# Patient Record
Sex: MALE | Race: WHITE | Employment: FULL TIME | URBAN - METROPOLITAN AREA
[De-identification: names, ages, dates, MRNs, and addresses within clinical notes are randomized per-mention and may not be internally consistent; named-entity substitution may affect disease eponyms.]

---

## 2020-08-10 ENCOUNTER — HOSPITAL ENCOUNTER (EMERGENCY)
Age: 70
Discharge: HOME | End: 2020-08-10
Payer: COMMERCIAL

## 2020-08-10 VITALS
BODY MASS INDEX: 30.21 KG/M2 | DIASTOLIC BLOOD PRESSURE: 85 MMHG | HEART RATE: 86 BPM | WEIGHT: 211 LBS | TEMPERATURE: 98.3 F | SYSTOLIC BLOOD PRESSURE: 136 MMHG | RESPIRATION RATE: 19 BRPM | OXYGEN SATURATION: 95 % | HEIGHT: 70 IN

## 2020-08-10 LAB
A/G RATIO: 1.2 (ref 1.1–2.2)
ALBUMIN SERPL-MCNC: 4.1 G/DL (ref 3.4–5)
ALP BLD-CCNC: 80 U/L (ref 40–129)
ALT SERPL-CCNC: 19 U/L (ref 10–40)
ANION GAP SERPL CALCULATED.3IONS-SCNC: 14 MMOL/L (ref 3–16)
AST SERPL-CCNC: 17 U/L (ref 15–37)
BASOPHILS ABSOLUTE: 0.1 K/UL (ref 0–0.2)
BASOPHILS RELATIVE PERCENT: 0.4 %
BILIRUB SERPL-MCNC: 0.9 MG/DL (ref 0–1)
BUN BLDV-MCNC: 19 MG/DL (ref 7–20)
CALCIUM SERPL-MCNC: 9.3 MG/DL (ref 8.3–10.6)
CHLORIDE BLD-SCNC: 103 MMOL/L (ref 99–110)
CO2: 22 MMOL/L (ref 21–32)
CREAT SERPL-MCNC: 0.7 MG/DL (ref 0.8–1.3)
EOSINOPHILS ABSOLUTE: 0.2 K/UL (ref 0–0.6)
EOSINOPHILS RELATIVE PERCENT: 1.5 %
GFR AFRICAN AMERICAN: >60
GFR NON-AFRICAN AMERICAN: >60
GLOBULIN: 3.3 G/DL
GLUCOSE BLD-MCNC: 105 MG/DL (ref 70–99)
HCT VFR BLD CALC: 48 % (ref 40.5–52.5)
HEMOGLOBIN: 16 G/DL (ref 13.5–17.5)
LACTIC ACID: 1.2 MMOL/L (ref 0.4–2)
LYMPHOCYTES ABSOLUTE: 1.3 K/UL (ref 1–5.1)
LYMPHOCYTES RELATIVE PERCENT: 11.1 %
MCH RBC QN AUTO: 29.4 PG (ref 26–34)
MCHC RBC AUTO-ENTMCNC: 33.3 G/DL (ref 31–36)
MCV RBC AUTO: 88.4 FL (ref 80–100)
MONOCYTES ABSOLUTE: 1.1 K/UL (ref 0–1.3)
MONOCYTES RELATIVE PERCENT: 9.1 %
NEUTROPHILS ABSOLUTE: 9.2 K/UL (ref 1.7–7.7)
NEUTROPHILS RELATIVE PERCENT: 77.9 %
PDW BLD-RTO: 15.1 % (ref 12.4–15.4)
PLATELET # BLD: 191 K/UL (ref 135–450)
PMV BLD AUTO: 7.6 FL (ref 5–10.5)
POTASSIUM SERPL-SCNC: 3.6 MMOL/L (ref 3.5–5.1)
RBC # BLD: 5.43 M/UL (ref 4.2–5.9)
SODIUM BLD-SCNC: 139 MMOL/L (ref 136–145)
TOTAL PROTEIN: 7.4 G/DL (ref 6.4–8.2)
WBC # BLD: 11.9 K/UL (ref 4–11)

## 2020-08-10 PROCEDURE — 87040 BLOOD CULTURE FOR BACTERIA: CPT

## 2020-08-10 PROCEDURE — 96367 TX/PROPH/DG ADDL SEQ IV INF: CPT

## 2020-08-10 PROCEDURE — 99282 EMERGENCY DEPT VISIT SF MDM: CPT

## 2020-08-10 PROCEDURE — 6360000002 HC RX W HCPCS: Performed by: PHYSICIAN ASSISTANT

## 2020-08-10 PROCEDURE — 96366 THER/PROPH/DIAG IV INF ADDON: CPT

## 2020-08-10 PROCEDURE — 85025 COMPLETE CBC W/AUTO DIFF WBC: CPT

## 2020-08-10 PROCEDURE — 2580000003 HC RX 258: Performed by: PHYSICIAN ASSISTANT

## 2020-08-10 PROCEDURE — 96365 THER/PROPH/DIAG IV INF INIT: CPT

## 2020-08-10 PROCEDURE — 80053 COMPREHEN METABOLIC PANEL: CPT

## 2020-08-10 PROCEDURE — 83605 ASSAY OF LACTIC ACID: CPT

## 2020-08-10 RX ORDER — 0.9 % SODIUM CHLORIDE 0.9 %
30 INTRAVENOUS SOLUTION INTRAVENOUS ONCE
Status: COMPLETED | OUTPATIENT
Start: 2020-08-10 | End: 2020-08-10

## 2020-08-10 RX ORDER — DOXYCYCLINE HYCLATE 100 MG
100 TABLET ORAL 2 TIMES DAILY
Qty: 20 TABLET | Refills: 0 | Status: SHIPPED | OUTPATIENT
Start: 2020-08-10 | End: 2020-08-20

## 2020-08-10 RX ADMIN — VANCOMYCIN HYDROCHLORIDE 1.5 G: 10 INJECTION, POWDER, LYOPHILIZED, FOR SOLUTION INTRAVENOUS at 14:32

## 2020-08-10 RX ADMIN — CEFEPIME HYDROCHLORIDE 2 G: 2 INJECTION, POWDER, FOR SOLUTION INTRAVENOUS at 13:56

## 2020-08-10 RX ADMIN — SODIUM CHLORIDE 2871 ML: 9 INJECTION, SOLUTION INTRAVENOUS at 13:56

## 2020-08-10 ASSESSMENT — ENCOUNTER SYMPTOMS
VOMITING: 0
ABDOMINAL PAIN: 0
SHORTNESS OF BREATH: 0
NAUSEA: 0
COLOR CHANGE: 1
EYES NEGATIVE: 1

## 2020-08-10 ASSESSMENT — PAIN DESCRIPTION - PAIN TYPE: TYPE: ACUTE PAIN

## 2020-08-10 ASSESSMENT — PAIN DESCRIPTION - DESCRIPTORS: DESCRIPTORS: SORE

## 2020-08-10 ASSESSMENT — PAIN DESCRIPTION - LOCATION: LOCATION: LEG

## 2020-08-10 ASSESSMENT — PAIN SCALES - GENERAL: PAINLEVEL_OUTOF10: 2

## 2020-08-10 ASSESSMENT — PAIN DESCRIPTION - ORIENTATION: ORIENTATION: LEFT

## 2020-08-10 NOTE — ED PROVIDER NOTES
[address] [facility]  ED  EMERGENCY DEPARTMENT ENCOUNTER        Pt Name: Jet Robins  MRN: [id number]  Jimgfrobson 1950  Date of evaluation: 8/10/2020  Provider: Aleksandra Chandler PA-C  PCP: No primary care provider on file. ED Attending: Mary Jo Mccarthy MD      This patient was not seen by the attending provider    History provided by the patient    CHIEF COMPLAINT:     Chief Complaint   Patient presents with    Abscess     patient reports he has an abscess on his inner thigh left leg. has been there since Saturday. went to PCP today and was told to go to the ED due to streaking down left leg. HISTORY OF PRESENT ILLNESS:      Jet Robins is a 81 y.o. male who arrives to the ED by private vehicle. Patient reports he was sent by his PCP, Dr. Peggy English. The patient noted some redness to the medial aspect of his left thigh on Saturday, 8/8. He states he felt somewhat ill on Saturday but he attributed it to a hangover. (He states he drank quite a bit of champagne Friday night). He has felt well since Saturday. Specifically he has not had any fevers, chills, body aches, nausea or vomiting. However, since Saturday he is also had worsening of the redness that is extended from the medial aspect of his left thigh all the way down to his left ankle. He saw primary care today and was sent to the ED for evaluation for possible \"abscess\". Patient admits to mild pain and \"sensitivity\" to the medial aspect of the left thigh but otherwise is not experiencing any further pain to his leg. He even went to work today. Nursing Notes were reviewed     REVIEW OF SYSTEMS:     Review of Systems   Constitutional: Negative for activity change, appetite change, chills and fever. HENT: Negative. Eyes: Negative. Respiratory: Negative for shortness of breath. Cardiovascular: Negative for chest pain. Gastrointestinal: Negative for abdominal pain, nausea and vomiting. Genitourinary: Negative. Musculoskeletal: Positive for myalgias (left thigh). Negative for neck pain. Skin: Positive for color change (Erythema left leg). Negative for wound. Neurological: Negative for dizziness and headaches. All other systems reviewed and are negative. Except as noted above in the ROS, all other systems were reviewed and negative. PAST MEDICAL HISTORY:     Past Medical History:   Diagnosis Date    Bilateral foot pain     Diabetes mellitus ([address]     Diverticulosis 2005    Colonoscopy    Edema 4/15/2014    Hypertension     Sleep apnea 13    Severe CPAP at 13 cwp: non-compliant but informed         SURGICAL HISTORY:      Past Surgical History:   Procedure Laterality Date    APPENDECTOMY      COLONOSCOPY  2004    KNEE SURGERY  1993    right         CURRENT MEDICATIONS:       Previous Medications    AMLODIPINE (NORVASC) 5 MG TABLET    Take 1 tablet by mouth daily. DICLOFENAC (VOLTAREN) 75 MG EC TABLET    Take 1 tablet by mouth 2 times daily. FUROSEMIDE (LASIX) 20 MG TABLET    Take 1 tablet by mouth daily. HYDROCHLOROTHIAZIDE (HYDRODIURIL) 25 MG TABLET    Take 1 tablet by mouth daily. METFORMIN (GLUCOPHAGE) 500 MG TABLET    TAKE ONE TABLET BY MOUTH TWICE A DAY WITH MEALS    METOPROLOL (TOPROL-XL) 200 MG XL TABLET    Take 1 tablet by mouth daily. POTASSIUM CHLORIDE (K-TABS) 10 MEQ TABLET    Take 1 tablet by mouth daily. QUINAPRIL (ACCUPRIL) 40 MG TABLET    Take 1 tablet by mouth nightly. ALLERGIES:    Patient has no known allergies.     FAMILY HISTORY:       Family History   Problem Relation Age of Onset    Cancer Father         Liver Cancer:  at 68    Diabetes Father     Hypertension Mother          at 68: \"unknown COD\"          SOCIAL HISTORY:       Social History     Socioeconomic History    Marital status:      Spouse name: Coni Munoz Number of children: 1    Years of education: Not on file    Highest education level: Not on file Occupational History    Occupation: [employer]   Social Needs    Financial resource strain: Not on file    Food insecurity     Worry: Not on file     Inability: Not on file   [employer] needs     Medical: Not on file     Non-medical: Not on file   Tobacco Use    Smoking status: Former Smoker     Last attempt to quit: 12/10/1997     Years since quittin.6   Substance and Sexual Activity    Alcohol use: Yes     Comment: infrequent    Drug use: Not on file    Sexual activity: Not on file   Lifestyle    Physical activity     Days per week: Not on file     Minutes per session: Not on file    Stress: Not on file   Relationships    Social connections     Talks on phone: Not on file     Gets together: Not on file     Attends Zoroastrian service: Not on file     Active member of club or organization: Not on file     Attends meetings of clubs or organizations: Not on file     Relationship status: Not on file    Intimate partner violence     Fear of current or ex partner: Not on file     Emotionally abused: Not on file     Physically abused: Not on file     Forced sexual activity: Not on file   Other Topics Concern    Not on file   Social History Narrative    Not on file       SCREENINGS:             PHYSICAL EXAM:       ED Triage Vitals [08/10/20 1320]   BP Temp Temp Source Pulse Resp SpO2 Height Weight   136/85 98.3 °F (36.8 °C) Oral 91 19 96 % 5' 10\" (1.778 m) 211 lb (95.7 kg)       Physical Exam    CONSTITUTIONAL: Awake and alert. Cooperative. Well-developed. Well-nourished. Non-toxic. No acute distress. HENT: Normocephalic. Atraumatic. External ears normal, without discharge. No nasal discharge. Oropharynx clear. Mucous membranes moist.  EYES: Conjunctiva non-injected. No scleral icterus. PERRL. EOM's grossly intact. NECK: Supple. Normal ROM. CARDIOVASCULAR: RRR. No Murmer. Intact distal pulses. PULMONARY/CHEST WALL: Effort normal. No tachypnea. Lungs clear to ausculation. Monocytes % 9.1 %    Eosinophils % 1.5 %    Basophils % 0.4 %    Neutrophils Absolute 9.2 (H) 1.7 - 7.7 K/uL    Lymphocytes Absolute 1.3 1.0 - 5.1 K/uL    Monocytes Absolute 1.1 0.0 - 1.3 K/uL    Eosinophils Absolute 0.2 0.0 - 0.6 K/uL    Basophils Absolute 0.1 0.0 - 0.2 K/uL   Lactic Acid, Plasma   Result Value Ref Range    Lactic Acid 1.2 0.4 - 2.0 mmol/L       PROCEDURES:   N/A    CRITICAL CARE TIME:       None      CONSULTS:  None      EMERGENCY DEPARTMENT COURSE and DIFFERENTIAL DIAGNOSIS/MDM:   Vitals:    Vitals:    08/10/20 1320 08/10/20 1618   BP: 136/85    Pulse: 91 86   Resp: 19    Temp: 98.3 °F (36.8 °C)    TempSrc: Oral    SpO2: 96% 95%   Weight: 211 lb (95.7 kg)    Height: 5' 10\" (1.778 m)        Patient was given the following medications:  Medications   0.9 % sodium chloride IV bolus 2,871 mL (0 mLs Intravenous Stopped 8/10/20 1530)   vancomycin 1.5 g in dextrose 5% 300 mL IVPB (1.5 g Intravenous New Bag 8/10/20 1432)   cefepime (MAXIPIME) 2 g IVPB minibag (0 g Intravenous Stopped 8/10/20 1432)         I have evaluated this patient in the ED. Old records were reviewed. This patient is here with erythema to the medial aspect of his left lower extremity. Symptoms started on Saturday and the region of erythema has spread significantly. However the patient himself is not feeling ill at all. He has not had a fever. His pain is mild. He is not feeling ill with body aches, nausea or vomiting. He is a diabetic but reports his blood sugars are well controlled and his last hemoglobin A1c was 6.8. Based on physical exam there is no evidence of abscess or deep tissue infection. Again he is afebrile. He is not tachycardic. Blood pressure is normal.  CBC reveals white count of 11.9. There is no bandemia. H&H normal.  Lactate is normal at 1.2. CMP is normal including glucose 105. Blood cultures x2 were drawn.   While the patient is here he received a liter of normal saline IV along with a dose of vancomycin and cefepime IV while waiting for results. On reassessment patient again continues to feel well. He continues to have normal vital signs. I think it reasonable to discharge the patient on oral antibiotics with the instruction that he will need to return to the ED if his symptoms worsen in any way. If he develops any fevers, vomiting or if he feels the infection states getting worse he will need to return to the ED and may require admission with IV antibiotics. He acknowledges understanding of all this instruction. I will place him on doxycycline as this will provide good MSSA and MRSA coverage based on her antibiogram.  I estimate there is LOW risk for SEPSIS, ABSCESS, COMPARTMENT SYNDROME, NECROTIZING FASCIITIS, TENDON OR NEUROVASCULAR INJURY, or RETAINED FOREIGN BODY, thus I consider the discharge disposition reasonable. Also, there is no evidence or peritonitis, sepsis, or toxicity. Marvin Ross and I have discussed the diagnosis and risks, and we agree with discharging home to follow-up with their primary doctor. We also discussed returning to the Emergency Department immediately if new or worsening symptoms occur. We have discussed the symptoms which are most concerning (e.g., changing or worsening pain, fever, numbness, weakness, cool or painful digits) that necessitate immediate return. FINAL IMPRESSION:      1.  Cellulitis of left lower extremity          DISPOSITION/PLAN:   DISPOSITION Decision To Discharge      PATIENT REFERRED TO:  María Elena Garsia DO  [address] [place] [address]  [phone]    Schedule an appointment as soon as possible for a visit in 3 days  For re-check    [facility]  ED  [address] [facility] [address] [facility]  Go to   If symptoms worsen      DISCHARGE MEDICATIONS:  New Prescriptions    DOXYCYCLINE HYCLATE (VIBRA-TABS) 100 MG TABLET    Take 1 tablet by mouth 2 times daily for 10 days                  (Please

## 2020-08-10 NOTE — LETTER
[facility]  ED  [address] [address]  Phone: [phone]  Fax: [phone]               August 10, 2020    Patient: Adela Sinclair   YOB: 1950   Date of Visit: 8/10/2020       To Whom It May Concern:    Adela Sinclair was seen and treated in our emergency department on 8/10/2020. He may return to work on 8/12/2020.       Sincerely,           Mary Lepe PA-C        Signature:__________________________________

## 2020-08-14 LAB
BLOOD CULTURE, ROUTINE: NORMAL
CULTURE, BLOOD 2: NORMAL

## 2021-09-07 ENCOUNTER — TELEPHONE (OUTPATIENT)
Dept: INTERNAL MEDICINE CLINIC | Age: 71
End: 2021-09-07

## 2021-09-07 NOTE — TELEPHONE ENCOUNTER
----- Message from Jose Luis Champagne sent at 2021 10:52 AM EDT -----  Subject: Appointment Request    Reason for Call: New Patient Request Appointment    QUESTIONS  Type of Appointment? New Patient/New to Provider  Reason for appointment request? Requested Provider unavailable - Dr Perez Diver  Additional Information for Provider? Wants to become a new pt and make a   new appt. screened green   ---------------------------------------------------------------------------  --------------  CALL BACK INFO  What is the best way for the office to contact you? OK to leave message on   voicemail  Preferred Call Back Phone Number? [phone]  ---------------------------------------------------------------------------  --------------  SCRIPT ANSWERS  Relationship to Patient? Self  Specialty Confirmation? Primary Care  Is this the first appointment to establish care for a ? No  Have you been diagnosed with, awaiting test results for, or told that you   are suspected of having COVID-19 (Coronavirus)? (If patient has tested   negative or was tested as a requirement for work, school, or travel and   not based on symptoms, answer no)? No  Do you currently have flu-like symptoms including fever or chills, cough,   shortness of breath, difficulty breathing, or new loss of taste or smell? No  Have you had close contact with someone with COVID-19 in the last 14 days? No  (Service Expert  click yes below to proceed with [employer] As Usual   Scheduling)?  Yes

## 2021-09-16 ENCOUNTER — OFFICE VISIT (OUTPATIENT)
Dept: PRIMARY CARE CLINIC | Age: 71
End: 2021-09-16
Payer: COMMERCIAL

## 2021-09-16 VITALS
SYSTOLIC BLOOD PRESSURE: 138 MMHG | HEIGHT: 70 IN | BODY MASS INDEX: 32.07 KG/M2 | WEIGHT: 224 LBS | HEART RATE: 80 BPM | DIASTOLIC BLOOD PRESSURE: 82 MMHG

## 2021-09-16 DIAGNOSIS — G47.33 OBSTRUCTIVE SLEEP APNEA SYNDROME: ICD-10-CM

## 2021-09-16 DIAGNOSIS — I10 ESSENTIAL HYPERTENSION, BENIGN: ICD-10-CM

## 2021-09-16 DIAGNOSIS — Z12.11 SCREEN FOR COLON CANCER: ICD-10-CM

## 2021-09-16 DIAGNOSIS — E11.9 TYPE 2 DIABETES MELLITUS WITHOUT COMPLICATION, WITHOUT LONG-TERM CURRENT USE OF INSULIN (HCC): Primary | ICD-10-CM

## 2021-09-16 LAB — HBA1C MFR BLD: 7.3 %

## 2021-09-16 PROCEDURE — 83036 HEMOGLOBIN GLYCOSYLATED A1C: CPT | Performed by: FAMILY MEDICINE

## 2021-09-16 PROCEDURE — 3051F HG A1C>EQUAL 7.0%<8.0%: CPT | Performed by: FAMILY MEDICINE

## 2021-09-16 PROCEDURE — 99204 OFFICE O/P NEW MOD 45 MIN: CPT | Performed by: FAMILY MEDICINE

## 2021-09-16 RX ORDER — HYDROCHLOROTHIAZIDE 12.5 MG/1
12.5 CAPSULE, GELATIN COATED ORAL EVERY MORNING
Qty: 90 CAPSULE | Refills: 3 | Status: SHIPPED | OUTPATIENT
Start: 2021-09-16 | End: 2021-10-25 | Stop reason: SDUPTHER

## 2021-09-16 RX ORDER — TAMSULOSIN HYDROCHLORIDE 0.4 MG/1
CAPSULE ORAL
COMMUNITY
Start: 2021-07-20 | End: 2021-09-16

## 2021-09-16 RX ORDER — SILODOSIN 8 MG/1
CAPSULE ORAL
COMMUNITY
Start: 2021-09-12

## 2021-09-16 RX ORDER — ATORVASTATIN CALCIUM 40 MG/1
TABLET, FILM COATED ORAL
COMMUNITY
Start: 2021-02-03 | End: 2021-09-16

## 2021-09-16 RX ORDER — EMPAGLIFLOZIN 25 MG/1
TABLET, FILM COATED ORAL
COMMUNITY
Start: 2021-02-03 | End: 2021-10-25 | Stop reason: SDUPTHER

## 2021-09-16 ASSESSMENT — PATIENT HEALTH QUESTIONNAIRE - PHQ9
1. LITTLE INTEREST OR PLEASURE IN DOING THINGS: 0
SUM OF ALL RESPONSES TO PHQ QUESTIONS 1-9: 0
SUM OF ALL RESPONSES TO PHQ QUESTIONS 1-9: 0
2. FEELING DOWN, DEPRESSED OR HOPELESS: 0
SUM OF ALL RESPONSES TO PHQ9 QUESTIONS 1 & 2: 0
SUM OF ALL RESPONSES TO PHQ QUESTIONS 1-9: 0

## 2021-09-16 ASSESSMENT — ENCOUNTER SYMPTOMS
ABDOMINAL PAIN: 0
SHORTNESS OF BREATH: 0
COUGH: 0
NAUSEA: 0
VOMITING: 0
CONSTIPATION: 0
DIARRHEA: 0

## 2021-09-16 NOTE — PROGRESS NOTES
Chief Complaint   Patient presents with    Diabetes    Hypertension    Sleep Apnea    Obesity       HPI:Chandler Duarte presents for evaluation and management of multiple medical problems. He was last seen by me in 2014. He notes he is taking and tolerating his diabetes medications. Denies lightheadedness dizziness tremulousness or diaphoresis. He is taking and tolerating his blood pressure medicine. Notes no side effects. He has a history of obesity and sleep apnea. He refuses to use CPAP. Review of Systems   Constitutional: Negative for chills and fever. Respiratory: Negative for cough and shortness of breath. Cardiovascular: Negative for chest pain and palpitations. Gastrointestinal: Negative for abdominal pain, constipation, diarrhea, nausea and vomiting. Endocrine: Negative for polyuria. Genitourinary: Negative for dysuria. No Known Allergies  New Prescriptions    HYDROCHLOROTHIAZIDE (MICROZIDE) 12.5 MG CAPSULE    Take 1 capsule by mouth every morning     Current Outpatient Medications   Medication Sig Dispense Refill    empagliflozin (JARDIANCE) 25 MG tablet TAKE ONE TABLET BY MOUTH DAILY      SILDENAFIL CITRATE PO Take 100 mg by mouth daily as needed      silodosin (RAPAFLO) 8 MG CAPS       metFORMIN (GLUCOPHAGE) 1000 MG tablet       hydroCHLOROthiazide (MICROZIDE) 12.5 MG capsule Take 1 capsule by mouth every morning 90 capsule 3    quinapril (ACCUPRIL) 40 MG tablet Take 1 tablet by mouth nightly. 90 tablet 3    metoprolol (TOPROL-XL) 200 MG XL tablet Take 1 tablet by mouth daily. 90 tablet 3    amLODIPine (NORVASC) 5 MG tablet Take 1 tablet by mouth daily. 90 tablet 3     No current facility-administered medications for this visit.        Past Medical History:   Diagnosis Date    Diabetes mellitus ([facility] [address]     Diverticulosis 06/01/2005    Colonoscopy    Hypertension     Sleep apnea 01/21/2013    Severe CPAP at 13 cwp: non-compliant but informed     Past Surgical History:   Procedure Laterality Date    APPENDECTOMY      COLONOSCOPY  2004    [address]    right     Family History   Problem Relation Age of Onset    Hypertension Mother          at 68: \"unknown COD\"   [place] Self Cancer Father         Liver Cancer:  at 68    Diabetes Father     No Known Problems Daughter      Social History     Tobacco Use    Smoking status: Former Smoker     Quit date: 12/10/1997     Years since quittin.7    Smokeless tobacco: Never Used   Substance Use Topics    Alcohol use: Yes     Comment: infrequent    Drug use: Never       Objective   /82   Pulse 80   Ht 5' 10\" (1.778 m)   Wt 224 lb (101.6 kg)   BMI 32.14 kg/m²   Wt Readings from Last 3 Encounters:   21 224 lb (101.6 kg)   08/10/20 211 lb (95.7 kg)   05/15/14 249 lb (112.9 kg)       Physical Exam  Constitutional:       Appearance: He is well-developed. HENT:      Head: Normocephalic and atraumatic. Nose: Nose normal.      Mouth/Throat:      Pharynx: No oropharyngeal exudate. Eyes:      General: No scleral icterus. Right eye: No discharge. Left eye: No discharge. Pupils: Pupils are equal, round, and reactive to light. Neck:      Thyroid: No thyromegaly. Cardiovascular:      Rate and Rhythm: Normal rate and regular rhythm. Pulses:           Dorsalis pedis pulses are 2+ on the right side and 2+ on the left side. Posterior tibial pulses are 2+ on the right side and 2+ on the left side. Heart sounds: Normal heart sounds. No murmur heard. No friction rub. No gallop. Comments: No Edema Lower Extremities  Pulmonary:      Effort: Pulmonary effort is normal.      Breath sounds: Normal breath sounds. No wheezing or rales. Abdominal:      General: Bowel sounds are normal. There is no distension. Palpations: Abdomen is soft. There is no hepatomegaly or splenomegaly. Tenderness: There is no abdominal tenderness.  There is no guarding or rebound. Musculoskeletal:         General: No tenderness or deformity. Normal range of motion. Cervical back: Normal range of motion and neck supple. Lymphadenopathy:      Cervical: No cervical adenopathy. Skin:     General: Skin is warm and dry. Findings: No erythema or rash. Neurological:      Mental Status: He is alert. Cranial Nerves: No cranial nerve deficit. Sensory: No sensory deficit. Gait: Gait normal.      Comments: Foot Exam: Skin warm, dry and intact w/o callus or erythema. Sensation intact to 10gm monofilament     Psychiatric:         Speech: Speech normal.         Behavior: Behavior normal.           Chemistry        Component Value Date/Time     08/10/2020 1336    K 3.6 08/10/2020 1336     08/10/2020 1336    CO2 22 08/10/2020 1336    BUN 19 08/10/2020 1336    CREATININE 0.7 (L) 08/10/2020 1336        Component Value Date/Time    CALCIUM 9.3 08/10/2020 1336    ALKPHOS 80 08/10/2020 1336    AST 17 08/10/2020 1336    ALT 19 08/10/2020 1336    BILITOT 0.9 08/10/2020 1336          Lab Results   Component Value Date    WBC 11.9 (H) 08/10/2020    HGB 16.0 08/10/2020    HCT 48.0 08/10/2020    MCV 88.4 08/10/2020     08/10/2020     Lab Results   Component Value Date    LABA1C 7.3 09/16/2021     Lab Results   Component Value Date    .1 05/15/2014     Lab Results   Component Value Date    LABA1C 7.3 09/16/2021     No components found for: CHLPL  Lab Results   Component Value Date    TRIG 127 10/14/2013    TRIG 180 (H) 12/10/2012     Lab Results   Component Value Date    HDL 35 (L) 10/14/2013    HDL 38 (L) 12/10/2012     Lab Results   Component Value Date    LDLCALC 85 10/14/2013    LDLCALC 85 12/10/2012     Lab Results   Component Value Date    LABVLDL 25 10/14/2013    LABVLDL 36 12/10/2012         Assessment   Plan   1. Type 2 diabetes mellitus without complication, without long-term current use of insulin (HCC)  Hemoglobin A1c was 7.3 today. Counseled patient on diet and exercise and continue meds recheck 3 months-     Lipid Panel; Future  -     Comprehensive Metabolic Panel; Future  -     POCT glycosylated hemoglobin (Hb A1C)  -     Microalbumin / Creatinine Urine Ratio; Future  -      DIABETES FOOT EXAM  2. Essential hypertension, benign  Marginal control we will add hydrochlorothiazide back into his regimen and follow-up in 6 weeks. -     Comprehensive Metabolic Panel; Future  -     hydroCHLOROthiazide (MICROZIDE) 12.5 MG capsule; Take 1 capsule by mouth every morning, Disp-90 capsule, R-3Normal  3. Obstructive sleep apnea syndrome   Counseled weight loss. Patient refuses CPAP. We will continue to educate about benefits of intervention and monitor  4. Screen for colon cancer    Reports he had a recent colonoscopy with Dr. Morena Vines. We will reach out to their office and follow-up on it  [facility] received counseling on the following healthy behaviors: nutrition and exercise    Patient given educational materials on Nutrition and Exercise    Discussed use, benefit, and side effects of prescribed medications. Barriers to medication compliance addressed. All patient questions answered. Pt voiced understanding.          Health Maintenance   Topic Date Due    AAA screen  Never done    Hepatitis C screen  Never done    Diabetic retinal exam  Never done    Shingles Vaccine (2 of 3) 04/12/2013    Lipid screen  10/14/2014    Diabetic microalbuminuria test  01/14/2015    Pneumococcal 65+ years Vaccine (2 of 2 - PPSV23) 07/21/2020    Potassium monitoring  08/10/2021    Creatinine monitoring  08/10/2021    Flu vaccine (1) 09/01/2021    Diabetic foot exam  09/16/2022    A1C test (Diabetic or Prediabetic)  09/16/2022    Colon cancer screen colonoscopy  09/12/2024    DTaP/Tdap/Td vaccine (3 - Td or Tdap) 02/27/2029    COVID-19 Vaccine  Completed    Hepatitis A vaccine  Aged Out    Hib vaccine  Aged Out    Meningococcal (ACWY) vaccine  Aged Out RTC 6 weeks and as needed

## 2021-09-16 NOTE — PATIENT INSTRUCTIONS
Examine your lifestyle and the barriers to bad and good habits and how you can design your life to make better choices    If you want to feel better these are the FUNDAMENTAL PILLARS of Wellness:    Make it EASY to do the RIGHT THINGS. 1)  You can choose to Get 150 min/week of moderate exercise (can talk but can't sing) or 75 min/week of vigorous exercise (can't talk)   This will enhance your sense of well being (Exercise is as good as medicine for depression.)    2)  You can choose to Get 7-9 hours of sleep per night    Detoxifies your brain, reduces risk of dementia    3)  You can choose to Strength Train 2 x a week on non-consecutive days   This will improve function and reduce risk of injury. Body weight type exercises such as Yoga and Pilates are good    4)  You can choose good nutrition. Only eat your goal weight (in lbs) x 10 calories/day and get 5 servings of Vegetables/day   Plant based diets reduce risk of heart attack/stroke and will help you feel full on less food. Avoid highly processed foods and processed carbohydrates. 5)  You can choose moderate alcohol intake < 1-2 drinks/day   Alcohol will disrupt your sleep and add calories to your day    6)  You can choose to develop a Charismatic/Supportive relationship. This will strengthen your resilience for the ups and downs. 7)  You can choose to Practice Mindfulness. An hour a day of prayer/meditation/gratitude will change your life! If you are trying to lose weight, here are some recommendations for weight loss:  Not every weight loss program is appropriate for everybody. ..  good online sources include Noom (more social with daily check ins), Lifesum (similar but less social) and Naturally slim, as well as Brandneu ($1500)    The GI Diet or \"Primal diet\", Intermittent fasting can also be effective choices. If you have diabetes treated with insulin be sure to ask me for specific guidance around meals.     Take your desired weight

## 2021-10-25 ENCOUNTER — OFFICE VISIT (OUTPATIENT)
Dept: PRIMARY CARE CLINIC | Age: 71
End: 2021-10-25
Payer: COMMERCIAL

## 2021-10-25 VITALS
SYSTOLIC BLOOD PRESSURE: 124 MMHG | HEART RATE: 76 BPM | HEIGHT: 70 IN | DIASTOLIC BLOOD PRESSURE: 77 MMHG | BODY MASS INDEX: 31.38 KG/M2 | WEIGHT: 219.2 LBS

## 2021-10-25 DIAGNOSIS — H91.90 HEARING LOSS, UNSPECIFIED HEARING LOSS TYPE, UNSPECIFIED LATERALITY: ICD-10-CM

## 2021-10-25 DIAGNOSIS — Z00.00 WELL ADULT EXAM: ICD-10-CM

## 2021-10-25 DIAGNOSIS — R41.3 MEMORY DIFFICULTIES: ICD-10-CM

## 2021-10-25 DIAGNOSIS — I10 ESSENTIAL HYPERTENSION, BENIGN: Primary | ICD-10-CM

## 2021-10-25 DIAGNOSIS — Z23 NEED FOR VACCINATION: ICD-10-CM

## 2021-10-25 DIAGNOSIS — I10 ESSENTIAL HYPERTENSION, BENIGN: ICD-10-CM

## 2021-10-25 DIAGNOSIS — E66.09 CLASS 1 OBESITY DUE TO EXCESS CALORIES WITH SERIOUS COMORBIDITY AND BODY MASS INDEX (BMI) OF 32.0 TO 32.9 IN ADULT: ICD-10-CM

## 2021-10-25 DIAGNOSIS — G47.33 OBSTRUCTIVE SLEEP APNEA SYNDROME: ICD-10-CM

## 2021-10-25 DIAGNOSIS — E11.9 TYPE 2 DIABETES MELLITUS WITHOUT COMPLICATION, WITHOUT LONG-TERM CURRENT USE OF INSULIN (HCC): ICD-10-CM

## 2021-10-25 LAB
A/G RATIO: 1.9 (ref 1.1–2.2)
ALBUMIN SERPL-MCNC: 5 G/DL (ref 3.4–5)
ALP BLD-CCNC: 81 U/L (ref 40–129)
ALT SERPL-CCNC: 27 U/L (ref 10–40)
ANION GAP SERPL CALCULATED.3IONS-SCNC: 18 MMOL/L (ref 3–16)
AST SERPL-CCNC: 23 U/L (ref 15–37)
BILIRUB SERPL-MCNC: 1 MG/DL (ref 0–1)
BUN BLDV-MCNC: 18 MG/DL (ref 7–20)
CALCIUM SERPL-MCNC: 9.8 MG/DL (ref 8.3–10.6)
CHLORIDE BLD-SCNC: 99 MMOL/L (ref 99–110)
CO2: 25 MMOL/L (ref 21–32)
CREAT SERPL-MCNC: 0.9 MG/DL (ref 0.8–1.3)
GFR AFRICAN AMERICAN: >60
GFR NON-AFRICAN AMERICAN: >60
GLOBULIN: 2.7 G/DL
GLUCOSE BLD-MCNC: 126 MG/DL (ref 70–99)
POTASSIUM SERPL-SCNC: 4.4 MMOL/L (ref 3.5–5.1)
SODIUM BLD-SCNC: 142 MMOL/L (ref 136–145)
TOTAL PROTEIN: 7.7 G/DL (ref 6.4–8.2)
TSH REFLEX: 2.81 UIU/ML (ref 0.27–4.2)
VITAMIN B-12: >2000 PG/ML (ref 211–911)

## 2021-10-25 PROCEDURE — 99214 OFFICE O/P EST MOD 30 MIN: CPT | Performed by: FAMILY MEDICINE

## 2021-10-25 PROCEDURE — 99397 PER PM REEVAL EST PAT 65+ YR: CPT | Performed by: FAMILY MEDICINE

## 2021-10-25 PROCEDURE — 3051F HG A1C>EQUAL 7.0%<8.0%: CPT | Performed by: FAMILY MEDICINE

## 2021-10-25 PROCEDURE — 90471 IMMUNIZATION ADMIN: CPT | Performed by: FAMILY MEDICINE

## 2021-10-25 PROCEDURE — 90732 PPSV23 VACC 2 YRS+ SUBQ/IM: CPT | Performed by: FAMILY MEDICINE

## 2021-10-25 RX ORDER — EMPAGLIFLOZIN 25 MG/1
TABLET, FILM COATED ORAL
Qty: 30 TABLET | Refills: 11 | Status: SHIPPED | OUTPATIENT
Start: 2021-10-25

## 2021-10-25 RX ORDER — TAMSULOSIN HYDROCHLORIDE 0.4 MG/1
CAPSULE ORAL
COMMUNITY
Start: 2021-09-17

## 2021-10-25 RX ORDER — HYDROCHLOROTHIAZIDE 12.5 MG/1
12.5 CAPSULE, GELATIN COATED ORAL EVERY MORNING
Qty: 90 CAPSULE | Refills: 3 | Status: SHIPPED | OUTPATIENT
Start: 2021-10-25 | End: 2022-10-20

## 2021-10-25 ASSESSMENT — PATIENT HEALTH QUESTIONNAIRE - PHQ9
SUM OF ALL RESPONSES TO PHQ QUESTIONS 1-9: 0
1. LITTLE INTEREST OR PLEASURE IN DOING THINGS: 0
SUM OF ALL RESPONSES TO PHQ9 QUESTIONS 1 & 2: 0
SUM OF ALL RESPONSES TO PHQ QUESTIONS 1-9: 0
SUM OF ALL RESPONSES TO PHQ QUESTIONS 1-9: 0
2. FEELING DOWN, DEPRESSED OR HOPELESS: 0

## 2021-10-25 NOTE — PATIENT INSTRUCTIONS
Please send in your Living will   [address] MD  [address] [place] [address]  Ph: [phone]  Fax: [phone]    For winter dry skin:   As cool a shower as you can tolerate. Moisturizer IMMEDIATELY upon drying off. PAT your skin dry rather than rubbing it. Can try taking fewer showers. Trial Zyrtec 10 mg for your rash if it does not help.

## 2021-10-25 NOTE — PROGRESS NOTES
Chief Complaint   Patient presents with    Medicare AWV    Hypertension    Obesity    Sleep Apnea         HPI:  Rajan Hunter is a 78 y.o. (:1950) here today   for well adult check and follow-up on medical problems. When he notes that he had a fall a week ago. Tripped over a fan in the dark. He has not had any other falls. His wife notes he has a little bit of trouble with his memory and he had mitts he has some difficulty with his hearing. He gets his vision checked yearly and is due to get it done again in February. He has an appoint with the dentist today. He notes he has a living well. He dislikes exercise and still does not want to get his sleep apnea treated. He thinks it is a scam because he could not hold his breath for 90 seconds on his own. He is taking tolerating his blood pressure medicine without lightheadedness or dizziness. He and his wife are working on walking more and he has lost about 5 pounds since his last visit. .        Review of Systems    Past Medical History:   Diagnosis Date    Diabetes mellitus ([facility] [address]     Diverticulosis 2005    Colonoscopy    Hypertension     Sleep apnea 2013    Severe CPAP at 13 cwp: non-compliant but informed     Family History   Problem Relation Age of Onset    Hypertension Mother          at 68: \"unknown COD\"   James Cancer Father         Liver Cancer:  at 68    Diabetes Father     No Known Problems Daughter      Social History     Socioeconomic History    Marital status:      Spouse name: Tato Araujo Number of children: 1    Years of education: Not on file    Highest education level: Not on file   Occupational History    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   Tobacco Use    Smoking status: Former Smoker     Quit date: 12/10/1997     Years since quittin.8    Smokeless tobacco: Never Used   Substance and Sexual Activity    Alcohol use: Yes     Comment: infrequent    Drug use: Never    Sexual activity: Not on file   Other Topics Concern    Not on file   Social History Narrative    Not on file     Social Determinants of Health     Financial Resource Strain:     Difficulty of Paying Living Expenses:    Food Insecurity:     Worried About Running Out of Food in the Last Year:     [address] in the Last Year:    Transportation Needs:     Lack of Transportation (Medical):  Lack of Transportation (Non-Medical):    Physical Activity:     Days of Exercise per Week:     Minutes of Exercise per Session:    Stress:     Feeling of Stress :    Social Connections:     Frequency of Communication with Friends and Family:     Frequency of Social Gatherings with Friends and Family:     Attends [employer] Services:     Active Member of Clubs or Organizations:     Attends Club or Organization Meetings:     Marital Status:    Intimate Partner Violence:     Fear of Current or Ex-Partner:     Emotionally Abused:     Physically Abused:     Sexually Abused:        New Prescriptions    No medications on file         Meds Prior to visit:  Prior to Visit Medications    Medication Sig Taking? Authorizing Provider   tamsulosin (FLOMAX) 0.4 MG capsule  Yes Historical Provider, MD   hydroCHLOROthiazide (MICROZIDE) 12.5 MG capsule Take 1 capsule by mouth every morning Yes Gibson Mckeon MD   empagliflozin (JARDIANCE) 25 MG tablet TAKE ONE TABLET BY MOUTH DAILY Yes Gibson Mckeon MD   SILDENAFIL CITRATE PO Take 100 mg by mouth daily as needed Yes Historical Provider, MD   silodosin (RAPAFLO) 8 MG CAPS  Yes Historical Provider, MD   metFORMIN (GLUCOPHAGE) 1000 MG tablet  Yes Historical Provider, MD   quinapril (ACCUPRIL) 40 MG tablet Take 1 tablet by mouth nightly. Yes Gibson Mckeon MD   metoprolol (TOPROL-XL) 200 MG XL tablet Take 1 tablet by mouth daily. Yes Gibson Mckeon MD   amLODIPine (NORVASC) 5 MG tablet Take 1 tablet by mouth daily.  Yes Gibson Mckeon MD     No Known Allergies    OBJECTIVE:    /77   Pulse 76   Ht 5' 10\" (1.778 m)   Wt 219 lb 3.2 oz (99.4 kg)   BMI 31.45 kg/m²   BP Readings from Last 2 Encounters:   10/25/21 124/77   09/16/21 138/82     Wt Readings from Last 3 Encounters:   10/25/21 219 lb 3.2 oz (99.4 kg)   09/16/21 224 lb (101.6 kg)   08/10/20 211 lb (95.7 kg)       Physical Exam  Constitutional:       Appearance: He is well-developed. He is obese. HENT:      Head: Normocephalic and atraumatic. Nose: Nose normal.      Mouth/Throat:      Pharynx: No oropharyngeal exudate. Comments: Class IV airway  Eyes:      General: No scleral icterus. Right eye: No discharge. Left eye: No discharge. Pupils: Pupils are equal, round, and reactive to light. Neck:      Thyroid: No thyromegaly. Cardiovascular:      Rate and Rhythm: Normal rate and regular rhythm. Pulses:           Dorsalis pedis pulses are 2+ on the right side and 2+ on the left side. Posterior tibial pulses are 2+ on the right side and 2+ on the left side. Heart sounds: Normal heart sounds. No murmur heard. No friction rub. No gallop. Comments: No Edema Lower Extremities  Pulmonary:      Effort: Pulmonary effort is normal.      Breath sounds: Normal breath sounds. No wheezing or rales. Abdominal:      General: Bowel sounds are normal. There is no distension. Palpations: Abdomen is soft. There is no hepatomegaly or splenomegaly. Tenderness: There is no abdominal tenderness. There is no guarding or rebound. Musculoskeletal:         General: No tenderness or deformity. Normal range of motion. Cervical back: Normal range of motion and neck supple. Lymphadenopathy:      Cervical: No cervical adenopathy. Skin:     General: Skin is warm and dry. Findings: No erythema or rash. Neurological:      Mental Status: He is alert. Cranial Nerves: No cranial nerve deficit. Sensory: No sensory deficit.       Gait: Gait normal.   Psychiatric:         Speech: Speech normal.         Behavior: Behavior normal.             Hemoglobin A1C (%)   Date Value   09/16/2021 7.3     Microalbumin, Random Urine (mg/dL)   Date Value   09/16/2021 3.30 (H)     LDL Calculated (mg/dL)   Date Value   09/16/2021 83       ASSESSMENT/PLAN:    1. Essential hypertension, benign  Controlled: Appears stable. We will continue current management and monitor for adverse reaction and disease progression. Follow-up as noted below    - Comprehensive Metabolic Panel; Future  - hydroCHLOROthiazide (MICROZIDE) 12.5 MG capsule; Take 1 capsule by mouth every morning  Dispense: 90 capsule; Refill: 3    2. Obstructive sleep apnea syndrome  Untreated and uncontrolled. Had a long discussion with patient and his wife. She will watch him for apneic episodes and document like the time he goes without breathing and how often he wakes up. 3. Class 1 obesity due to excess calories with serious comorbidity and body mass index (BMI) of 32.0 to 32.9 in adult  Making progress. Counseled on diet and exercise follow-up 3 months    4. Need for vaccination  Vaccinated  - Pneumococcal polysaccharide vaccine 23-valent greater than or equal to 1yo subcutaneous/IM    5. Memory difficulties  Patient had a Folstein of 29 today. We will check some background labs. Counseled patient on importance of sleep  - TSH with Reflex; Future  - Vitamin B12; Future    6. Hearing loss, unspecified hearing loss type, unspecified laterality  Patient referred for hearing evaluation  - [address]. D., Audiology, [facility]    7. Well adult exam  Appears well:  Counselled diet, development, anticipatory guidance and safety issues with patient and or parent(s).       8. Type 2 diabetes mellitus without complication, without long-term current use of insulin ([address]  Fair control: Continue Jardiance and work on diet and weight loss  - empagliflozin (JARDIANCE) 25 MG tablet; TAKE ONE TABLET BY MOUTH DAILY  Dispense: 30 tablet; Refill: 11      RTC Return in about 3 months (around 1/25/2022). Scribe attestation:  Pete Bhatia MD, am scribing for and in the presence of Jayla Mathis MD. Electronically signed by Jayla Mathis MD on 10/25/2021 at 8:28 AM            Provider attestation: Pete Bhatia MD, personally performed the services scribed by the user listed above in my presence, and it is both accurate and complete. I agree with the ROS and Past Histories independently gathered by the clinical support staff and the remaining scribed note accurately describes my personal service to the patient.         Jayla Mathis MD    10/25/2021  8:28 AM

## 2021-10-25 NOTE — PROGRESS NOTES
Medicare Annual Wellness Visit  Name: Jayshree Valle Date: 10/25/2021   MRN: <[id number]> Sex: Male   Age: 78 y.o. Ethnicity: Non- / Non    : 1950 Race: White (non-)      Anibal Marks is here for Medicare AWV, Hypertension, Obesity, and Sleep Apnea    Screenings for behavioral, psychosocial and functional/safety risks, and cognitive dysfunction are all negative except as indicated below. These results, as well as other patient data from the [address] form, are documented in Flowsheets linked to this Encounter. No Known Allergies    Prior to Visit Medications    Medication Sig Taking? Authorizing Provider   tamsulosin (FLOMAX) 0.4 MG capsule  Yes Historical Provider, MD   empagliflozin (JARDIANCE) 25 MG tablet TAKE ONE TABLET BY MOUTH DAILY Yes Historical Provider, MD   SILDENAFIL CITRATE PO Take 100 mg by mouth daily as needed Yes Historical Provider, MD   silodosin (RAPAFLO) 8 MG CAPS  Yes Historical Provider, MD   metFORMIN (GLUCOPHAGE) 1000 MG tablet  Yes Historical Provider, MD   hydroCHLOROthiazide (MICROZIDE) 12.5 MG capsule Take 1 capsule by mouth every morning Yes Yuliana Quintana MD   quinapril (ACCUPRIL) 40 MG tablet Take 1 tablet by mouth nightly. Yes Yuliana Quintana MD   metoprolol (TOPROL-XL) 200 MG XL tablet Take 1 tablet by mouth daily. Yes Yuliana Quintana MD   amLODIPine (NORVASC) 5 MG tablet Take 1 tablet by mouth daily.  Yes Yuliana Quintana MD       Past Medical History:   Diagnosis Date    Diabetes mellitus ([facility] [address]     Diverticulosis 2005    Colonoscopy    Hypertension     Sleep apnea 2013    Severe CPAP at 13 cwp: non-compliant but informed       Past Surgical History:   Procedure Laterality Date    APPENDECTOMY      COLONOSCOPY  2004    KNEE SURGERY  1993    right       Family History   Problem Relation Age of Onset    Hypertension Mother          at 68: \"unknown COD\"    Cancer Father         Liver Cancer:  at 68    Diabetes Father     No Known Problems Daughter        CareTeam (Including outside providers/suppliers regularly involved in providing care):   Patient Care Team:  Alvin Johnson MD as PCP - General (Family Medicine)  Alvin Johnson [facility]    Wt Readings from Last 3 Encounters:   10/25/21 219 lb 3.2 oz (99.4 kg)   21 224 lb (101.6 kg)   08/10/20 211 lb (95.7 kg)     Vitals:    10/25/21 0744   BP: 124/77   Pulse: 76   Weight: 219 lb 3.2 oz (99.4 kg)   Height: 5' 10\" (1.778 m)     Body mass index is 31.45 kg/m². Based upon direct observation of the patient, evaluation of cognition reveals {MEMORY:50256}. {OPTIONAL FOR THIS VISIT TYPE - GENERAL PHYSICAL EXAM (THIS WILL AUTO-DELETE IF NOT NLUR):793845247}    Patient's complete Health Risk Assessment and screening values have been reviewed and are found in Flowsheets. The following problems were reviewed today and where indicated follow up appointments were made and/or referrals ordered.     Positive Risk Factor Screenings with Interventions:          General Health and ACP:     Advance Directives     Power of  Living Will ACP-Advance Directive ACP-Power of     Not on File Not on File Not on File Not on File      General Health Risk Interventions:  · {Medicare AW General Health Risk Interventions:607841747}    Health Habits/Nutrition:     Body mass index: (!) 31.45  Health Habits/Nutrition Interventions:  · {Medicare AWV Health Habits/Nutrition Interventions:773042548}       Personalized Preventive Plan   Current Health Maintenance Status  Immunization History   Administered Date(s) Administered    COVID-19, Pfizer, PF, 30mcg/0.3mL 2021, 2021    Hepatitis B 1995    Hepatitis B Adult (Recombivax HB) 1995    Hepatitis B Ped/Adol (Engerix-B, Recombivax HB) 1995    Influenza A ([id number]) Vaccine PF IM 2009    Influenza Virus Vaccine 2009, 2012, 09/10/2013, 10/27/2015, 10/01/2016, 10/01/2019    Influenza Whole 09/10/2013    Influenza, High Dose (Fluzone 65 yrs and older) 10/27/2018, 11/09/2019    Influenza, High-dose, Quadv, 65 yrs +, IM (Fluzone) 11/28/2020    Influenza, Intradermal, Quadrivalent, Preservative Free 10/23/2014    Influenza, Quadv, IM, PF (6 mo and older Fluzone, Flulaval, Fluarix, and 3 yrs and older Afluria) 10/10/2017    Pneumococcal Conjugate 13-valent ([id number]) 01/18/2018    Pneumococcal Polysaccharide ([id number]) 07/21/2015, 10/25/2021    Tdap (Boostrix, Adacel) 03/31/2008, 02/27/2019    Zoster Live (Zostavax) 01/01/2011, 02/15/2013        Health Maintenance   Topic Date Due    AAA screen  Never done    Hepatitis C screen  Never done    Diabetic retinal exam  Never done    Shingles Vaccine (2 of 3) 04/12/2013    COVID-19 Vaccine (3 - Pfizer booster) 10/21/2021    Diabetic foot exam  09/16/2022    A1C test (Diabetic or Prediabetic)  09/16/2022    Diabetic microalbuminuria test  09/16/2022    Lipid screen  09/16/2022    Potassium monitoring  09/16/2022    Creatinine monitoring  09/16/2022    Colon cancer screen colonoscopy  09/12/2024    DTaP/Tdap/Td vaccine (3 - Td or Tdap) 02/27/2029    Flu vaccine  Completed    Pneumococcal 65+ years Vaccine  Completed    Hepatitis A vaccine  Aged Out    Hib vaccine  Aged Out    Meningococcal (ACWY) vaccine  Aged Out     Recommendations for Definition 6 Due: see orders and patient instructions/AVS.  . Recommended screening schedule for the next 5-10 years is provided to the patient in written form: see Patient Instructions/AVS.    Sadiq Martinez was seen today for medicare awv, hypertension, obesity and sleep apnea.     Diagnoses and all orders for this visit:    Essential hypertension, benign    Obstructive sleep apnea syndrome    Class 1 obesity due to excess calories with serious comorbidity and body mass index (BMI) of 32.0 to 32.9 in adult    Need for vaccination  - Pneumococcal polysaccharide vaccine 23-valent greater than or equal to 3yo subcutaneous/IM    Memory difficulties    Hearing loss, unspecified hearing loss type, unspecified laterality

## 2021-11-18 ENCOUNTER — PROCEDURE VISIT (OUTPATIENT)
Dept: AUDIOLOGY | Age: 71
End: 2021-11-18
Payer: COMMERCIAL

## 2021-11-18 DIAGNOSIS — H90.3 SENSORINEURAL HEARING LOSS, BILATERAL: Primary | ICD-10-CM

## 2021-11-18 PROCEDURE — 92557 COMPREHENSIVE HEARING TEST: CPT | Performed by: AUDIOLOGIST

## 2021-11-18 PROCEDURE — 92567 TYMPANOMETRY: CPT | Performed by: AUDIOLOGIST

## 2021-11-18 NOTE — PATIENT INSTRUCTIONS
Good Communication Strategies    Communication can be challenging for anyone, but can be especially difficult for those with some degree of hearing loss. While we may not be able to control every factor that may lead to difficulty with communication, there are Good Communication Strategies that we can all use in our day-to-day lives. Communication takes both parties working together for it to be successful. Tips as a Listener:   1. Control your environment. It is important to limit the amount of background noise in the room when possible. You should also consider having a good light source in the room to best see the other person. 2. Ask for clarification. Instead of saying \"What?\", you can use parts of what you heard to make a new question. For example, if you heard the word \"Thursday\" but not the rest of the week, you may ask \"What was that about Thursday? \" or \"What did you want to do Thursday? \". This shows the person talking that you are listening and will help them better explain what they are saying. 3. Be an advocate for yourself. If you are hearing but not understanding, tell the other person \"I can hear you, but I need you to slow down when you speak. \"  Or if someone is facing the other direction, say \"I cannot hear you when you are not looking at me when we talk. \"       Tips as a Talker:   - Sit or stand 3 to 6 feet away to maximize audibility         -- It is unrealistic to believe someone else will fully hear your message if you are speaking from across the room or in a different room in the house   - Stay at eye level to help with visual cues   - Make sure you have the persons attention before speaking   - Use facial expressions and gestures to accentuate your message   - Raise your voice slightly (do not scream)   - Speak slowly and distinctly   - Use short, simple sentences   - Rephrase your words if the person is having a hard time understanding you    - To avoid distortion, dont speak directly into a persons ear      Some additional items that may be helpful:   - Remain patient - this is important for both parties   - Write down items that still cannot be heard/understood. You may write with pen/paper or consider typing/texting on a cell phone or smart device. - If background noise is unavoidable, try to keep yourself in a good position in the room. By sitting at a mackey on the side of the restaurant (preferably a corner), it will be easier to communicate than if you were sitting at a table in the middle with background noise surrounding you. Keep yourself positioned away from music speakers or heavy foot traffic.   - If you have difficulty with the television, consider these options:      -- Use closed-captioning, which is a setting you can turn on that displays the spoken words in a written form on the screen. There may be a slight delay, but this can help fill in missing information. This can be especially helpful when watching programs with accented speech. -- Consider use of a sound bar or speakers that come from the front of the TV. With modern flat screen TVs, many of them have speakers that come out of the back of the device, which makes sound bounce off the wall behind it, then go into the room. Sound bars can allow the sound to go straight in your direction and can improve sound quality. -- Consider ear level devices to help improve the volume and/or sound quality of the program.  There are devices that work like headphones that you can adjust the volume for your ears while others can have the volume at a more comfortable level, such as \"TV Ears\". Most hearing aids have devices that allow them to connect directly to the TV and improve sound quality. Hearing Loss: Care Instructions  Your Care Instructions      Hearing loss is a sudden or slow decrease in how well you hear. It can range from mild to profound.  Permanent hearing loss can occur with aging, and it can happen when you are exposed long-term to loud noise. Examples include listening to loud music, riding motorcycles, or being around other loud machines. Hearing loss can affect your work and home life. It can make you feel lonely or depressed. You may feel that you have lost your independence. But hearing aids and other devices can help you hear better and feel connected to others. Follow-up care is a key part of your treatment and safety. Be sure to make and go to all appointments, and call your doctor if you are having problems. It's also a good idea to know your test results and keep a list of the medicines you take. How can you care for yourself at home? · Avoid loud noises whenever possible. This helps keep your hearing from getting worse. Always wear hearing protection around loud noises. · If appropriate, wear hearing aid(s) as directed. It is recommended that hearing aids are worn during all waking hours to keep your brain active and give it access to the sounds it is missing. · If you are beginning your process with hearing aid(s), schedule a \"Hearing Aid Evaluation\" with an audiologist to discuss your lifestyle, features of hearing aid technology, and styles of hearing aids available. It is recommended that you contact your insurance company to determine if you have a hearing aid benefit, as this may dictate who you can see for these services. · Have hearing tests as your doctor suggests. They can show whether your hearing has changed. Your hearing aid may need to be adjusted. · Use other assistive devices as needed. These may include:  ? Telephone amplifiers and hearing aids that can connect to a television, stereo, radio, or microphone. ? Devices that use lights or vibrations. These alert you to the doorbell, a ringing telephone, or a baby monitor. ? Television closed-captioning. This shows the words at the bottom of the screen. Most new TVs can do this. ? TTY (text telephone). This lets you type messages back and forth on the telephone instead of talking or listening. These devices are also called TDD. When messages are typed on the keyboard, they are sent over the phone line to a receiving TTY. The message is shown on a monitor. · Use pagers, fax machines, text, and email if it is hard for you to communicate by telephone. · Try to learn a listening technique called speech-reading. It is not lip-reading. You pay attention to people's gestures, expressions, posture, and tone of voice. These clues can help you understand what a person is saying. Face the person you are talking to, and have him or her face you. Make sure the lighting is good. You need to see the other person's face clearly. · Think about counseling if you need help to adjust to your hearing loss. When should you call for help? Watch closely for changes in your health, and be sure to contact your doctor if:    · You think your hearing is getting worse. · You have new symptoms, such as dizziness or nausea. Noise-Induced Hearing Loss  What it is, and what you can do to prevent it    Exposure to loud sounds, in an occupational setting or recreational, can cause permanent hearing loss. Sound is measured in decibels (dB). Noise-induced hearing loss is the ONLY type of preventable hearing loss. Hearing loss related to noise exposure can occur at any age. There are small sensory cells, called inner and outer hair cells, within the inner ear (cochlea). These cells process the loudness (intensity) and pitch (frequency) of sound and send the signal to the brain via our auditory nerve (vestibulocochlear nerve, cranial nerve VIII). When these cells are damaged, they can result in permanent hearing loss and/or tinnitus. The hair cells responsible for high frequency sounds, like birds chirping, are most likely to be damaged due to loud sounds.   The high frequency sounds are also very important for our clarity and understanding of speech. OCCUPATIONAL NOISE EXPOSURE RECREATIONAL NOISE EXPOSURE   Some jobs may have exposure to loud sounds in the workplace. These jobs may include but are not limited to:  [employer]   Construction   Welding   Landscaping   Hairdressing/hairstyling   Musicians  [employer]    ... And more! Many activities outside of work may cause permanent hearing loss. These activities may include but are not limited to:  Lawnmowers, leaf blowers  [employer] (such as pigs squealing)   Chainsaws and other power tools  [employer] musical instruments and/or singing   Listening to music too loudly - at concerts, through stereo, through ear buds or headphones   Attending sporting events   Attending fireworks shows or using fireworks at home  [employer] of firearms   . .. And more! REDUCE OR PROTECT YOUR EARS FROM NOISE EXPOSURE    To do your best to avoid noise-induced hearing loss, here are some tips:   Limit exposure to loud sounds. 85 dB (decibels) is safe for 8 hours. As sounds are louder, the length of time the sound is safe lessens. These numbers are cumulative across a 24-hour period. (NIOSH and CDC, 2002)  o 85 dB is safe for 8 hours  o 88 dB is safe for 4 hours  o 91 dB is safe for 2 hours  o 94 dB is safe for 1 hour  o 97 dB is safe for 30 minutes  o 100 dB is safe for 15 minutes  o 103 dB is safe for 7.5 minutes  o 106 dB is safe for 3.75 minutes  o 109 dB is safe for LESS THAN 2 minutes  o 112 dB is safe for LESS THAN 1 minute  o 115 dB is safe for ~ 30 seconds  o 130 dB can cause IMMEDIATE hearing loss   If you are unsure if a sound is too loud, consider checking the sound level with a \"sound level meter\". There are apps on smart devices, such as \"Decibel X\", that can measure the loudness of the sound.   They are not as accurate as expensive equipment used by scientists, but it will give you a guesstimate of how loud the sound is, and if it may be damaging to your hearing.  If you cannot avoid loud sounds, here are ways to reduce your exposure:  o 1. Wear hearing protection  - Ear plugs and protective ear muffs can be used to reduce the intensity of the sound. The higher the NRR (noise reduction rating), the better reduction of the intensity of the sound   o 2. Turn the volume down  - When listening to music, turn the volume down, especially when wearing ear buds or headphones. A good rule of thumb is to not go beyond the middle setting on your device. If you can't hear someone talking to you from arm's length away, your music may be at a level that it can cause damage. If someone else can hear your music from 3 feet away, it may also be at a level that it can cause damage. o 3. Walk away from the sound  - If you do not have the ability to wear hearing protection or turn down the volume of the sound, you should do your best to move away from the source of the sound. - Sound decreases in intensity as we move further from the source. The sound will decrease by 6 dB for every doubling of distance from the sound source. TYPES OF HEARING PROTECTION    The most common types of hearing protection are protective ear muffs and ear plugs. Protective ear muffs are commonly found at home improvement or sporting good stores, they can be worn time and time again and are great if you need to take your hearing protection off frequently. Ear plugs are often made of foam or soft silicone. The foam ones are designed for one-time use, while silicone ear plugs may be used multiple times. There are also \"filtered\" ear plugs that help provide even attenuation of the sound across all frequencies. These are great for listening to music or going to concerts, and allow for better understanding of speech in louder environments.   They can be purchased at music stores or online retailers (search \"Ety Plugs\" or \"filtered ear plugs\"), or custom earmolds can be made with an audiologist.    There are \"custom\" hearing protection devices that you can further discuss with your audiologist based on your specific needs, if desired. Exposure to these sounds may cause permanent damage to your hearing.   If you suspect your hearing has changed, it is recommended that you have your hearing tested by your audiologist.

## 2021-11-18 NOTE — PROGRESS NOTES
Diamond Ricks   1950, 73 y.o. male   <[id number]>       Referring Provider: Carlene Kidd MD   Referral Type: In an order in [address] [place] [address]    Reason for Visit: Evaluation of suspected change in hearing, tinnitus, or balance. ADULT AUDIOLOGIC EVALUATION      Diamond Ricks is a 73 y.o. male seen today, 11/18/2021, for an initial audiologic evaluation. 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 Diamond Ricks denied otalgia, aural fullness, otorrhea, tinnitus, dizziness, imbalance, history of head trauma, history of ear surgery, and family history of hearing loss. IMPRESSIONS:       Today's results are consistent with bilateral sensorineural hearing loss with normal middle ear function and excellent word recognition for soft conversational speech bilaterally. Hearing loss is significant enough to result in difficulty understanding speech in at least some listening environments, such as those with background noise. Discussed good communication strategies, safe listening levels, and future considerations for amplification. ASSESSMENT AND FINDINGS:       Otoscopy revealed: Minimal cerumen in ear canals bilaterally      RIGHT EAR:  Hearing Sensitivity: Borderline-normal to mild through 2000 Hz sloping to moderate sensorineural hearing loss. Speech Recognition Threshold: 25 dBHL  Word Recognition: Excellent (96%), based on NU-6 25-word list at 50 dBHL using recorded speech stimuli. Tympanometry: Normal peak pressure and compliance, Type A tympanogram, consistent with normal middle ear function.       LEFT EAR:  Hearing Sensitivity: Within normal limits to mild through 2000 Hz sloping to moderate 20 - 40   Moderate 40 - 55   Moderately-Severe 55 - 70   Severe 70 - 90   Profound 90 +

## 2021-11-18 NOTE — Clinical Note
Dr. Lorrie Latham,    Thank you for your referral for audiologic testing on this patient. Today's results are consistent with bilateral sensorineural hearing loss with normal middle ear function and excellent word recognition for soft conversational speech bilaterally. Hearing loss is significant enough to result in difficulty understanding speech in at least some listening environments, such as those with background noise. Discussed good communication strategies, safe listening levels, and future considerations for amplification. If you have any questions, or if there is anything else you need, please let me know.       Best,    [address], [place]  Audiologist  ---  [address]  ENT - Audiology

## 2022-01-25 ENCOUNTER — OFFICE VISIT (OUTPATIENT)
Dept: PRIMARY CARE CLINIC | Age: 72
End: 2022-01-25
Payer: COMMERCIAL

## 2022-01-25 VITALS
SYSTOLIC BLOOD PRESSURE: 133 MMHG | BODY MASS INDEX: 32.07 KG/M2 | DIASTOLIC BLOOD PRESSURE: 78 MMHG | HEIGHT: 70 IN | HEART RATE: 73 BPM | TEMPERATURE: 97.7 F | WEIGHT: 224 LBS

## 2022-01-25 DIAGNOSIS — R41.3 MEMORY DIFFICULTIES: ICD-10-CM

## 2022-01-25 DIAGNOSIS — G47.33 OBSTRUCTIVE SLEEP APNEA SYNDROME: ICD-10-CM

## 2022-01-25 DIAGNOSIS — I10 ESSENTIAL HYPERTENSION, BENIGN: Primary | ICD-10-CM

## 2022-01-25 DIAGNOSIS — E11.9 TYPE 2 DIABETES MELLITUS WITHOUT COMPLICATION, WITHOUT LONG-TERM CURRENT USE OF INSULIN (HCC): ICD-10-CM

## 2022-01-25 PROCEDURE — 99214 OFFICE O/P EST MOD 30 MIN: CPT | Performed by: FAMILY MEDICINE

## 2022-01-25 SDOH — ECONOMIC STABILITY: FOOD INSECURITY: WITHIN THE PAST 12 MONTHS, YOU WORRIED THAT YOUR FOOD WOULD RUN OUT BEFORE YOU GOT MONEY TO BUY MORE.: NEVER TRUE

## 2022-01-25 SDOH — ECONOMIC STABILITY: FOOD INSECURITY: WITHIN THE PAST 12 MONTHS, THE FOOD YOU BOUGHT JUST DIDN'T LAST AND YOU DIDN'T HAVE MONEY TO GET MORE.: NEVER TRUE

## 2022-01-25 ASSESSMENT — SOCIAL DETERMINANTS OF HEALTH (SDOH): HOW HARD IS IT FOR YOU TO PAY FOR THE VERY BASICS LIKE FOOD, HOUSING, MEDICAL CARE, AND HEATING?: NOT HARD AT ALL

## 2022-01-25 NOTE — PROGRESS NOTES
Chief Complaint   Patient presents with    3 Month Follow-Up    Hypertension    Diabetes       HPI: Rosa Savage  presents for evaluation and management of diabetes, hypertension, sleep apnea, obesity and memory issues    Cecil Mitchell notes that he is feeling pretty good. He does not check his blood sugars but is compliant with his diabetes medications. He notes no symptoms of anxiety or tremulousness or blurred vision. He does have urinary frequency but notes that improved with Flomax. He notes he is taking and tolerating his blood pressure medicine without lightheadedness or dizziness. He admits he gained some weight. Holidays did not help. He does not think he is having episodes of sleep apnea states he is asked his wife a couple times. He does not want to go get a formal CPAP device but is willing to get an AutoPap device. He notes no issues with his memory      Review of Systems    No Known Allergies  New Prescriptions    No medications on file     Current Outpatient Medications   Medication Sig Dispense Refill    tamsulosin (FLOMAX) 0.4 MG capsule       hydroCHLOROthiazide (MICROZIDE) 12.5 MG capsule Take 1 capsule by mouth every morning 90 capsule 3    empagliflozin (JARDIANCE) 25 MG tablet TAKE ONE TABLET BY MOUTH DAILY 30 tablet 11    SILDENAFIL CITRATE PO Take 100 mg by mouth daily as needed      silodosin (RAPAFLO) 8 MG CAPS       metFORMIN (GLUCOPHAGE) 1000 MG tablet       quinapril (ACCUPRIL) 40 MG tablet Take 1 tablet by mouth nightly. 90 tablet 3    metoprolol (TOPROL-XL) 200 MG XL tablet Take 1 tablet by mouth daily. 90 tablet 3    amLODIPine (NORVASC) 5 MG tablet Take 1 tablet by mouth daily. 90 tablet 3     No current facility-administered medications for this visit.        Past Medical History:   Diagnosis Date    Diabetes mellitus ([facility] [address] [address]     Diverticulosis 06/01/2005    Colonoscopy    Hypertension     Sleep apnea 01/21/2013    Severe CPAP at 13 cwp: non-compliant but informed Objective   /78 (Site: Right Upper Arm, Position: Sitting, Cuff Size: Medium Adult)   Pulse 73   Temp 97.7 °F (36.5 °C) (Axillary)   Ht 5' 10\" (1.778 m)   Wt 224 lb (101.6 kg)   BMI 32.14 kg/m²   Wt Readings from Last 3 Encounters:   01/25/22 224 lb (101.6 kg)   10/25/21 219 lb 3.2 oz (99.4 kg)   09/16/21 224 lb (101.6 kg)       Physical Exam  Constitutional:       Appearance: He is well-developed. Cardiovascular:      Rate and Rhythm: Normal rate and regular rhythm. Heart sounds: No murmur heard. No friction rub. No gallop. Pulmonary:      Effort: Pulmonary effort is normal.      Breath sounds: Normal breath sounds. No wheezing or rales. Abdominal:      General: Bowel sounds are normal. There is no distension. Palpations: Abdomen is soft. There is no mass. Tenderness: There is no abdominal tenderness. Skin:     General: Skin is warm and dry. Findings: No rash. Neurological:      Comments:  Folstein: 30 out of 30           Chemistry        Component Value Date/Time     10/25/2021 0838    K 4.4 10/25/2021 0838    CL 99 10/25/2021 0838    CO2 25 10/25/2021 0838    BUN 18 10/25/2021 0838    CREATININE 0.9 10/25/2021 0838        Component Value Date/Time    CALCIUM 9.8 10/25/2021 0838    ALKPHOS 81 10/25/2021 0838    AST 23 10/25/2021 0838    ALT 27 10/25/2021 0838    BILITOT 1.0 10/25/2021 0838          Lab Results   Component Value Date    WBC 11.9 (H) 08/10/2020    HGB 16.0 08/10/2020    HCT 48.0 08/10/2020    MCV 88.4 08/10/2020     08/10/2020     Lab Results   Component Value Date    LABA1C 7.3 09/16/2021     Lab Results   Component Value Date    .1 05/15/2014     Lab Results   Component Value Date    LABA1C 7.3 09/16/2021     No components found for: CHLPL  Lab Results   Component Value Date    TRIG 137 09/16/2021    TRIG 127 10/14/2013    TRIG 180 (H) 12/10/2012     Lab Results   Component Value Date    HDL 43 09/16/2021    HDL 35 (L) 10/14/2013    HDL 38 (L) 12/10/2012     Lab Results   Component Value Date    LDLCALC 83 09/16/2021    LDLCALC 85 10/14/2013    LDLCALC 85 12/10/2012     Lab Results   Component Value Date    LABVLDL 27 09/16/2021    LABVLDL 25 10/14/2013    LABVLDL 36 12/10/2012         Assessment   Plan   1. Essential hypertension, benign  Controlled: Appears stable. We will continue current management and monitor for adverse reaction and disease progression. Follow-up as noted below      2. Obstructive sleep apnea syndrome  Gave patient prescription for AutoPap. Advised him to go to CPAP. com to purchase a device. Follow-up in 3 months    3. Memory difficulties  Appears to be doing well. Monitor    4. Type 2 diabetes mellitus without complication, without long-term current use of insulin (HCC)  Marginal control: Continue meds and follow-up in 3 months with labs      Discussed use, benefit, and side effects of prescribed medications. Barriers to medication compliance addressed. All patient questions answered. Pt voiced understanding. RTC Return in about 3 months (around 4/25/2022).

## 2022-04-11 ENCOUNTER — OFFICE VISIT (OUTPATIENT)
Dept: PRIMARY CARE CLINIC | Age: 72
End: 2022-04-11
Payer: COMMERCIAL

## 2022-04-11 VITALS
TEMPERATURE: 99.7 F | HEART RATE: 126 BPM | SYSTOLIC BLOOD PRESSURE: 141 MMHG | DIASTOLIC BLOOD PRESSURE: 79 MMHG | WEIGHT: 226.6 LBS | HEIGHT: 70 IN | BODY MASS INDEX: 32.44 KG/M2

## 2022-04-11 DIAGNOSIS — N39.0 URINARY TRACT INFECTION WITH HEMATURIA, SITE UNSPECIFIED: Primary | ICD-10-CM

## 2022-04-11 DIAGNOSIS — R50.9 FEVER, UNSPECIFIED FEVER CAUSE: ICD-10-CM

## 2022-04-11 DIAGNOSIS — E11.9 TYPE 2 DIABETES MELLITUS WITHOUT COMPLICATION, WITHOUT LONG-TERM CURRENT USE OF INSULIN (HCC): ICD-10-CM

## 2022-04-11 DIAGNOSIS — R31.9 URINARY TRACT INFECTION WITH HEMATURIA, SITE UNSPECIFIED: Primary | ICD-10-CM

## 2022-04-11 DIAGNOSIS — I10 ESSENTIAL HYPERTENSION, BENIGN: ICD-10-CM

## 2022-04-11 LAB
BILIRUBIN, POC: NEGATIVE
BLOOD URINE, POC: ABNORMAL
CLARITY, POC: CLEAR
COLOR, POC: YELLOW
GLUCOSE URINE, POC: 1000
KETONES, POC: ABNORMAL
LEUKOCYTE EST, POC: NEGATIVE
NITRITE, POC: NEGATIVE
PH, POC: 5
PROTEIN, POC: NEGATIVE
SPECIFIC GRAVITY, POC: 1.01
UROBILINOGEN, POC: 0.2

## 2022-04-11 PROCEDURE — 99214 OFFICE O/P EST MOD 30 MIN: CPT | Performed by: FAMILY MEDICINE

## 2022-04-11 PROCEDURE — 81002 URINALYSIS NONAUTO W/O SCOPE: CPT | Performed by: FAMILY MEDICINE

## 2022-04-11 NOTE — PROGRESS NOTES
Chief Complaint   Patient presents with    Urinary Frequency    Urinary Tract Infection       HPI: Harinder Eid  presents for evaluation and management of possible urinary tract infection. What he notes a 1 day history of fever to 101.5 associated with polyuria but no dysuria, no increased thirst no shortness of breath no rash or pain no headache. He notes he was recently diagnosed with shingles and started on valacyclovir for this. He is compliant with his medications and notes no issues with them           Review of Systems    No Known Allergies  New Prescriptions    No medications on file     Current Outpatient Medications   Medication Sig Dispense Refill    tamsulosin (FLOMAX) 0.4 MG capsule       hydroCHLOROthiazide (MICROZIDE) 12.5 MG capsule Take 1 capsule by mouth every morning 90 capsule 3    empagliflozin (JARDIANCE) 25 MG tablet TAKE ONE TABLET BY MOUTH DAILY 30 tablet 11    SILDENAFIL CITRATE PO Take 100 mg by mouth daily as needed      silodosin (RAPAFLO) 8 MG CAPS       metFORMIN (GLUCOPHAGE) 1000 MG tablet       quinapril (ACCUPRIL) 40 MG tablet Take 1 tablet by mouth nightly. 90 tablet 3    metoprolol (TOPROL-XL) 200 MG XL tablet Take 1 tablet by mouth daily. 90 tablet 3    amLODIPine (NORVASC) 5 MG tablet Take 1 tablet by mouth daily. 90 tablet 3     No current facility-administered medications for this visit. Past Medical History:   Diagnosis Date    Diabetes mellitus ([facility]     Diverticulosis 06/01/2005    Colonoscopy    Hypertension     Sleep apnea 01/21/2013    Severe CPAP at 13 cwp: non-compliant but informed         Objective   BP (!) 141/79   Pulse 126   Temp 99.7 °F (37.6 °C)   Ht 5' 10\" (1.778 m)   Wt 226 lb 9.6 oz (102.8 kg)   BMI 32.51 kg/m²   Wt Readings from Last 3 Encounters:   04/11/22 226 lb 9.6 oz (102.8 kg)   01/25/22 224 lb (101.6 kg)   10/25/21 219 lb 3.2 oz (99.4 kg)       Physical Exam  Constitutional:       Appearance: He is well-developed.    HENT: Value Date/Time    CALCIUM 9.8 10/25/2021 0838    ALKPHOS 81 10/25/2021 0838    AST 23 10/25/2021 0838    ALT 27 10/25/2021 0838    BILITOT 1.0 10/25/2021 0838          Lab Results   Component Value Date    WBC 11.9 (H) 08/10/2020    HGB 16.0 08/10/2020    HCT 48.0 08/10/2020    MCV 88.4 08/10/2020     08/10/2020     Lab Results   Component Value Date    LABA1C 7.3 09/16/2021     Lab Results   Component Value Date    .1 05/15/2014     Lab Results   Component Value Date    LABA1C 7.3 09/16/2021     No components found for: CHLPL  Lab Results   Component Value Date    TRIG 137 09/16/2021    TRIG 127 10/14/2013    TRIG 180 (H) 12/10/2012     Lab Results   Component Value Date    HDL 43 09/16/2021    HDL 35 (L) 10/14/2013    HDL 38 (L) 12/10/2012     Lab Results   Component Value Date    LDLCALC 83 09/16/2021    LDLCALC 85 10/14/2013    LDLCALC 85 12/10/2012     Lab Results   Component Value Date    LABVLDL 27 09/16/2021    LABVLDL 25 10/14/2013    LABVLDL 36 12/10/2012         Assessment   Plan   1. Urinary tract infection with hematuria, site unspecified  We will send urine for culture. Given his underlying diagnosis of diabetes he is at risk for complicated illness. Offered patient lab testing he elects to defer and will follow up with me via [facility] tomorrow  - POCT Urinalysis no Micro    2. Essential hypertension, benign  Marginally elevated. Suspect related to underlying acute illness. We will see patient back in 2 weeks    3. Type 2 diabetes mellitus without complication, without long-term current use of insulin (HCC)  Historical fair control: Continue meds and recheck in 2 weeks    4. Fever, unspecified fever cause  We will check urine culture and test patient for Covid. He has no other symptoms or findings today  - Culture, Urine  - COVID-19; Future    Discussed use, benefit, and side effects of prescribed medications. Barriers to medication compliance addressed.   All patient questions answered. Pt voiced understanding. RTC Return if symptoms worsen or fail to improve.

## 2022-04-11 NOTE — PATIENT INSTRUCTIONS
COVID testing locations Drive through    1. [facility] hospitals  (Outreach Car Covid Collection)  ADDRESS:  [address] [place] [address] phone [phone]; Fax [phone] --  M-F [phone]. Around Back by the [facility]-designated parking spots with a phone number to call for service. ? No appointment needed. Servicing:  ? [facility] with Epic orders. o Epic Test code = [id number]. (Future Lab Collect). ? [employer] employees directed from [employer]. ? Pre-Procedure Patients in need of Covid Collection   o Greater than 3 days of their scheduled procedure [facility] will collect.   o Within 3 days of a scheduled procedure, do not direct patient to the Laboratory Outreach Drawsite.     - Patient will need to present to the facility the procedure will be performed or to their Primary Care Doctor, [address], Urgent Care, [facility], [facility], etc.    2.  Jermaine Thakur (Currently Open)  Esequiel Alvarez (ANGELICA/ Darrick Roca ) (Outreach Car Covid Collection) :  o Front of the building - designated parking spots with a phone number to call for service. ADDRESS:  [address] [place] [address],[address] [address] phone [phone]; Fax [phone]  --  M-F [id number]. ? No appointment needed. Servicing:  ? [facility] with Epic orders. o Epic Test code = [id number]. (Future Lab Collect). ? [employer] employees directed from [employer]. ? Pre-Procedure Covid collection   o Greater than 3 days of their scheduled procedure [facility] will collect.   o Within 3 days of a scheduled procedure, do not direct patient to the Laboratory Outreach Drawsite.  Patient will need to present to the facility the procedure will be performed or to their Primary Care Doctor, Yan[address], Urgent Care, [facility], [facility], etc.  Walk in locations:    1.  [facility])  [facility]   [address] [facility], [facility] Tyrell Cohen [place] [address]  [phone]  The patient must call to make an appointment (and make sure they have a test)*    2. [address]  [phone]  **Patients are instructed to drive to the back of the building and there are signs posted with instructions. **Office must fax order to [phone]    3.  [address] [place] [address]  [phone]  **Patients must read posted signs for instructions  **Office must fax order to [phone]

## 2022-04-12 ENCOUNTER — PATIENT MESSAGE (OUTPATIENT)
Dept: PRIMARY CARE CLINIC | Age: 72
End: 2022-04-12

## 2022-04-12 ENCOUNTER — TELEPHONE (OUTPATIENT)
Dept: PRIMARY CARE CLINIC | Age: 72
End: 2022-04-12

## 2022-04-12 DIAGNOSIS — L03.116 CELLULITIS OF LEFT LOWER EXTREMITY: Primary | ICD-10-CM

## 2022-04-12 LAB — URINE CULTURE, ROUTINE: NORMAL

## 2022-04-12 RX ORDER — CEPHALEXIN 500 MG/1
500 CAPSULE ORAL 3 TIMES DAILY
Qty: 30 CAPSULE | Refills: 0 | Status: SHIPPED | OUTPATIENT
Start: 2022-04-12 | End: 2022-04-22

## 2022-04-12 RX ORDER — SULFAMETHOXAZOLE AND TRIMETHOPRIM 800; 160 MG/1; MG/1
1 TABLET ORAL 2 TIMES DAILY
Qty: 10 TABLET | Refills: 0 | Status: SHIPPED | OUTPATIENT
Start: 2022-04-12 | End: 2022-04-17

## 2022-04-12 NOTE — TELEPHONE ENCOUNTER
----- Message from Gaurang Herron sent at 4/12/2022  7:36 AM EDT -----  Subject: Message to Provider    QUESTIONS  Information for Provider? Darrius Hale is a patient of Dr. Real Bailon. Darrius Hale is calling to let Dr. Sangita Alvarez know how is doing this morning, he wanted him to let him know. He said he does feel a little bit better, still has a fever. He also has a rash on his left shin. He did a home covid test that is Negative.   ---------------------------------------------------------------------------  --------------  CALL BACK INFO  What is the best way for the office to contact you? OK to leave message on   voicemail  Preferred Call Back Phone Number? [phone]  ---------------------------------------------------------------------------  --------------  SCRIPT ANSWERS  Relationship to Patient?  Self

## 2022-04-12 NOTE — TELEPHONE ENCOUNTER
From: Lalo Wilcox  To: Dr. [address]: 4/12/2022 1:47 PM EDT  Subject: Covid Test    Do I still need a Covid Test.

## 2022-04-14 NOTE — TELEPHONE ENCOUNTER
Cellulitis follow-up:    I called patient: He notes his rash is improving and his fever has resolved.   I counseled him to complete his antibiotics for his cellulitis

## 2022-04-27 ENCOUNTER — OFFICE VISIT (OUTPATIENT)
Dept: PRIMARY CARE CLINIC | Age: 72
End: 2022-04-27
Payer: COMMERCIAL

## 2022-04-27 VITALS
DIASTOLIC BLOOD PRESSURE: 71 MMHG | BODY MASS INDEX: 31.82 KG/M2 | SYSTOLIC BLOOD PRESSURE: 112 MMHG | TEMPERATURE: 96.4 F | WEIGHT: 221.8 LBS | HEART RATE: 69 BPM

## 2022-04-27 DIAGNOSIS — I10 ESSENTIAL HYPERTENSION, BENIGN: ICD-10-CM

## 2022-04-27 DIAGNOSIS — E11.9 TYPE 2 DIABETES MELLITUS WITHOUT COMPLICATION, WITHOUT LONG-TERM CURRENT USE OF INSULIN (HCC): Primary | ICD-10-CM

## 2022-04-27 LAB — HBA1C MFR BLD: 8.6 %

## 2022-04-27 PROCEDURE — 4040F PNEUMOC VAC/ADMIN/RCVD: CPT | Performed by: FAMILY MEDICINE

## 2022-04-27 PROCEDURE — G8427 DOCREV CUR MEDS BY ELIG CLIN: HCPCS | Performed by: FAMILY MEDICINE

## 2022-04-27 PROCEDURE — 1036F TOBACCO NON-USER: CPT | Performed by: FAMILY MEDICINE

## 2022-04-27 PROCEDURE — 3017F COLORECTAL CA SCREEN DOC REV: CPT | Performed by: FAMILY MEDICINE

## 2022-04-27 PROCEDURE — 83036 HEMOGLOBIN GLYCOSYLATED A1C: CPT | Performed by: FAMILY MEDICINE

## 2022-04-27 PROCEDURE — 99214 OFFICE O/P EST MOD 30 MIN: CPT | Performed by: FAMILY MEDICINE

## 2022-04-27 PROCEDURE — G8417 CALC BMI ABV UP PARAM F/U: HCPCS | Performed by: FAMILY MEDICINE

## 2022-04-27 PROCEDURE — 3052F HG A1C>EQUAL 8.0%<EQUAL 9.0%: CPT | Performed by: FAMILY MEDICINE

## 2022-04-27 PROCEDURE — 1123F ACP DISCUSS/DSCN MKR DOCD: CPT | Performed by: FAMILY MEDICINE

## 2022-04-27 PROCEDURE — 2022F DILAT RTA XM EVC RTNOPTHY: CPT | Performed by: FAMILY MEDICINE

## 2022-04-27 RX ORDER — ATORVASTATIN CALCIUM 20 MG/1
20 TABLET, FILM COATED ORAL NIGHTLY
Qty: 90 TABLET | Refills: 3 | Status: SHIPPED | OUTPATIENT
Start: 2022-04-27 | End: 2022-07-26

## 2022-04-27 RX ORDER — DULAGLUTIDE 0.75 MG/.5ML
0.75 INJECTION, SOLUTION SUBCUTANEOUS WEEKLY
Qty: 4 PEN | Refills: 5 | Status: SHIPPED | OUTPATIENT
Start: 2022-04-27 | End: 2022-08-01

## 2022-04-27 NOTE — PROGRESS NOTES
Chief Complaint   Patient presents with    Hypertension    Follow-up     cellulitis     Diabetes       HPI: Yris Quigley  presents for evaluation and management of diabetes, hypertension and obesity. He notes he is taking and tolerating his blood pressure medicines without lightheadedness or dizziness. He does not check his blood pressures. He has been taking and tolerating his diabetes medications. He notes he gets a little bit of intermittent diarrhea but not in any consistent manner and notes no nausea. He does not check his blood sugars either. He has lost about 5 pounds. States he tries to eat healthy diet and avoid eating out. He is not very physically active though he is going to start work on a new home. States he expects to do a lot of yard work with that. Review of Systems    No Known Allergies  New Prescriptions    ATORVASTATIN (LIPITOR) 20 MG TABLET    Take 1 tablet by mouth nightly    DULAGLUTIDE (TRULICITY) 4.30 XL/1.4IB SOPN    Inject 0.75 mg into the skin once a week     Current Outpatient Medications   Medication Sig Dispense Refill    Dulaglutide (TRULICITY) 4.35 IO/6.9EX SOPN Inject 0.75 mg into the skin once a week 4 pen 5    atorvastatin (LIPITOR) 20 MG tablet Take 1 tablet by mouth nightly 90 tablet 3    tamsulosin (FLOMAX) 0.4 MG capsule       hydroCHLOROthiazide (MICROZIDE) 12.5 MG capsule Take 1 capsule by mouth every morning 90 capsule 3    empagliflozin (JARDIANCE) 25 MG tablet TAKE ONE TABLET BY MOUTH DAILY 30 tablet 11    SILDENAFIL CITRATE PO Take 100 mg by mouth daily as needed      metFORMIN (GLUCOPHAGE) 1000 MG tablet       quinapril (ACCUPRIL) 40 MG tablet Take 1 tablet by mouth nightly. 90 tablet 3    metoprolol (TOPROL-XL) 200 MG XL tablet Take 1 tablet by mouth daily. 90 tablet 3    amLODIPine (NORVASC) 5 MG tablet Take 1 tablet by mouth daily.  90 tablet 3    silodosin (RAPAFLO) 8 MG CAPS  (Patient not taking: Reported on 4/27/2022)       No current facility-administered medications for this visit. Past Medical History:   Diagnosis Date    Diabetes mellitus ([facility] [address]     Diverticulosis 06/01/2005    Colonoscopy    Hypertension     Sleep apnea 01/21/2013    Severe CPAP at 13 cwp: non-compliant but informed         Objective   /71 (Site: Right Upper Arm, Position: Sitting, Cuff Size: Large Adult)   Pulse 69   Temp 96.4 °F (35.8 °C) (Temporal)   Wt 221 lb 12.8 oz (100.6 kg)   BMI 31.82 kg/m²   Wt Readings from Last 3 Encounters:   04/27/22 221 lb 12.8 oz (100.6 kg)   04/11/22 226 lb 9.6 oz (102.8 kg)   01/25/22 224 lb (101.6 kg)       Physical Exam  Constitutional:       Appearance: He is well-developed. Cardiovascular:      Rate and Rhythm: Normal rate and regular rhythm. Heart sounds: No murmur heard. No friction rub. No gallop. Pulmonary:      Effort: Pulmonary effort is normal.      Breath sounds: Normal breath sounds. No wheezing or rales. Abdominal:      General: Bowel sounds are normal. There is no distension. Palpations: Abdomen is soft. There is no mass. Tenderness: There is no abdominal tenderness. Skin:     General: Skin is warm and dry. Findings: No rash.            Chemistry        Component Value Date/Time     10/25/2021 0838    K 4.4 10/25/2021 0838    CL 99 10/25/2021 0838    CO2 25 10/25/2021 0838    BUN 18 10/25/2021 0838    CREATININE 0.9 10/25/2021 0838        Component Value Date/Time    CALCIUM 9.8 10/25/2021 0838    ALKPHOS 81 10/25/2021 0838    AST 23 10/25/2021 0838    ALT 27 10/25/2021 0838    BILITOT 1.0 10/25/2021 0838          Lab Results   Component Value Date    WBC 11.9 (H) 08/10/2020    HGB 16.0 08/10/2020    HCT 48.0 08/10/2020    MCV 88.4 08/10/2020     08/10/2020     Lab Results   Component Value Date    LABA1C 8.6 04/27/2022     Lab Results   Component Value Date    .1 05/15/2014     Lab Results   Component Value Date    LABA1C 8.6 04/27/2022     No components found for: CHLPL  Lab Results   Component Value Date    TRIG 137 09/16/2021    TRIG 127 10/14/2013    TRIG 180 (H) 12/10/2012     Lab Results   Component Value Date    HDL 43 09/16/2021    HDL 35 (L) 10/14/2013    HDL 38 (L) 12/10/2012     Lab Results   Component Value Date    LDLCALC 83 09/16/2021    LDLCALC 85 10/14/2013    LDLCALC 85 12/10/2012     Lab Results   Component Value Date    LABVLDL 27 09/16/2021    LABVLDL 25 10/14/2013    LABVLDL 36 12/10/2012         Assessment   Plan   1. Type 2 diabetes mellitus without complication, without long-term current use of insulin (HCC)  Hemoglobin A1c was 8.6 today. This is poorly controlled. We will start him on Trulicity in addition to his other medications and recheck in 3 months with labs at that time. We will also check his cholesterol at that time  - Dulaglutide (TRULICITY) 3.82 TF/6.5TU SOPN; Inject 0.75 mg into the skin once a week  Dispense: 4 pen; Refill: 5  - atorvastatin (LIPITOR) 20 MG tablet; Take 1 tablet by mouth nightly  Dispense: 90 tablet; Refill: 3  - POCT glycosylated hemoglobin (Hb A1C)    2. Essential hypertension, benign  Controlled: Appears stable. We will continue current management and monitor for adverse reaction and disease progression. Follow-up as noted below        [place] received counseling on the following healthy behaviors: nutrition and exercise  Discussed use, benefit, and side effects of prescribed medications. Barriers to medication compliance addressed. All patient questions answered. Pt voiced understanding. RTC Return in about 3 months (around 7/27/2022).

## 2022-05-23 DIAGNOSIS — I10 HYPERTENSION: ICD-10-CM

## 2022-05-23 RX ORDER — METOPROLOL SUCCINATE 200 MG/1
200 TABLET, EXTENDED RELEASE ORAL DAILY
Qty: 90 TABLET | Refills: 3 | Status: SHIPPED | OUTPATIENT
Start: 2022-05-23 | End: 2022-05-24 | Stop reason: SDUPTHER

## 2022-05-23 RX ORDER — QUINAPRIL 40 MG/1
40 TABLET ORAL NIGHTLY
Qty: 90 TABLET | Refills: 3 | Status: SHIPPED | OUTPATIENT
Start: 2022-05-23 | End: 2022-05-24 | Stop reason: SDUPTHER

## 2022-05-23 RX ORDER — AMLODIPINE BESYLATE 5 MG/1
5 TABLET ORAL DAILY
Qty: 90 TABLET | Refills: 3 | Status: SHIPPED | OUTPATIENT
Start: 2022-05-23 | End: 2022-05-24 | Stop reason: SDUPTHER

## 2022-05-23 NOTE — TELEPHONE ENCOUNTER
Medication:   Requested Prescriptions     Pending Prescriptions Disp Refills    metoprolol succinate (TOPROL XL) 200 MG extended release tablet 90 tablet 3     Sig: Take 1 tablet by mouth daily    amLODIPine (NORVASC) 5 MG tablet 90 tablet 3     Sig: Take 1 tablet by mouth daily    quinapril (ACCUPRIL) 40 MG tablet 90 tablet 3     Sig: Take 1 tablet by mouth nightly    metFORMIN (GLUCOPHAGE) 1000 MG tablet 60 tablet         Last Filled:  04/15/2014, 04/14/14,08/24/21    Patient Phone Number: [phone] (home)     Last appt: 4/27/2022   Next appt: 7/28/2022    Last OARRS: No flowsheet data found.

## 2022-05-23 NOTE — TELEPHONE ENCOUNTER
metFORMIN (GLUCOPHAGE) 1000 MG tablet     metoprolol (TOPROL-XL) 200 MG XL tablet     amLODIPine (NORVASC) 5 MG tablet     quinapril (ACCUPRIL) 40 MG tablet     [facility] - [place], [address] [place], [place] [id number] - F [phone]   [place], [place] [address]   Phone:  [phone]  Fax:  [phone]

## 2022-05-24 RX ORDER — AMLODIPINE BESYLATE 5 MG/1
5 TABLET ORAL DAILY
Qty: 90 TABLET | Refills: 3 | Status: SHIPPED | OUTPATIENT
Start: 2022-05-24 | End: 2023-05-24

## 2022-05-24 RX ORDER — QUINAPRIL 40 MG/1
40 TABLET ORAL NIGHTLY
Qty: 90 TABLET | Refills: 3 | Status: SHIPPED | OUTPATIENT
Start: 2022-05-24

## 2022-05-24 RX ORDER — METOPROLOL SUCCINATE 200 MG/1
200 TABLET, EXTENDED RELEASE ORAL DAILY
Qty: 90 TABLET | Refills: 3 | Status: SHIPPED | OUTPATIENT
Start: 2022-05-24

## 2022-05-24 NOTE — TELEPHONE ENCOUNTER
Medication:   Requested Prescriptions     Pending Prescriptions Disp Refills    metoprolol succinate (TOPROL XL) 200 MG extended release tablet 90 tablet 3     Sig: Take 1 tablet by mouth daily    quinapril (ACCUPRIL) 40 MG tablet 90 tablet 3     Sig: Take 1 tablet by mouth nightly    metFORMIN (GLUCOPHAGE) 1000 MG tablet 60 tablet 5     Sig: Take 1 tablet by mouth 2 times daily (with meals)    amLODIPine (NORVASC) 5 MG tablet 90 tablet 3     Sig: Take 1 tablet by mouth daily     Signed Prescriptions Disp Refills    metoprolol succinate (TOPROL XL) 200 MG extended release tablet 90 tablet 3     Sig: Take 1 tablet by mouth daily     Authorizing Provider: Nata SANTANA    amLODIPine (NORVASC) 5 MG tablet 90 tablet 3     Sig: Take 1 tablet by mouth daily     Authorizing Provider: Nata SANTANA    quinapril (ACCUPRIL) 40 MG tablet 90 tablet 3     Sig: Take 1 tablet by mouth nightly     Authorizing Provider: Madeline Carrasquillo metFORMIN (GLUCOPHAGE) 1000 MG tablet 60 tablet 5     Sig: Take 1 tablet by mouth 2 times daily (with meals)     Authorizing Provider: Jeanine Cantrell        Last Filled: All medication was sent in yesterday to the wrong pharmacy     Patient Phone Number: [phone] (home)     Last appt: 4/27/2022   Next appt: 7/28/2022    Last OARRS: No flowsheet data found.

## 2022-05-24 NOTE — TELEPHONE ENCOUNTER
Pt called back he said called asking for all meds to  Be [facility] upset they were sent to [facility] can we please send  To right pharmacy  [facility] [address], [place] [phone]

## 2022-05-31 ENCOUNTER — TELEPHONE (OUTPATIENT)
Dept: PRIMARY CARE CLINIC | Age: 72
End: 2022-05-31

## 2022-05-31 NOTE — TELEPHONE ENCOUNTER
----- Message from [employer] sent at 5/31/2022 10:47 AM EDT -----  Subject: Message to Provider    QUESTIONS  Information for Provider? Clarissa Case (pharm tech) with [facility]   pharmacy was calling to get a quantity increase for the pt.'s metformin   1000mg that was sent over on the 24th of May. Was wanting to get that   increased to a quantity of 180 instead of 60 for a 90 day supply. [phone] option 2 and reference number? [id number]  ---------------------------------------------------------------------------  --------------  Lisa GUAN  What is the best way for the office to contact you? OK to leave message on   voicemail  Preferred Call Back Phone Number?  [phone]  ---------------------------------------------------------------------------  --------------  SCRIPT ANSWERS  undefined

## 2022-05-31 NOTE — TELEPHONE ENCOUNTER
----- Message from Ossie Rinne sent at 5/31/2022 10:47 AM EDT -----  Subject: Message to Provider    QUESTIONS  Information for Provider? Stephanie Grande (pharm tech) with [facility]   pharmacy was calling to get a quantity increase for the pt.'s metformin   1000mg that was sent over on the 24th of May. Was wanting to get that   increased to a quantity of 180 instead of 60 for a 90 day supply. [phone] option 2 and reference number? [id number]  ---------------------------------------------------------------------------  --------------  Angela GUAN  What is the best way for the office to contact you? OK to leave message on   voicemail  Preferred Call Back Phone Number?  [phone]  ---------------------------------------------------------------------------  --------------  SCRIPT ANSWERS  undefined

## 2022-08-01 ENCOUNTER — OFFICE VISIT (OUTPATIENT)
Dept: PRIMARY CARE CLINIC | Age: 72
End: 2022-08-01
Payer: COMMERCIAL

## 2022-08-01 VITALS
TEMPERATURE: 97.4 F | SYSTOLIC BLOOD PRESSURE: 126 MMHG | BODY MASS INDEX: 31.19 KG/M2 | WEIGHT: 217.4 LBS | HEART RATE: 79 BPM | DIASTOLIC BLOOD PRESSURE: 84 MMHG

## 2022-08-01 DIAGNOSIS — E11.9 TYPE 2 DIABETES MELLITUS WITHOUT COMPLICATION, WITHOUT LONG-TERM CURRENT USE OF INSULIN (HCC): Primary | ICD-10-CM

## 2022-08-01 DIAGNOSIS — I10 ESSENTIAL HYPERTENSION, BENIGN: ICD-10-CM

## 2022-08-01 DIAGNOSIS — S16.1XXA STRAIN OF NECK MUSCLE, INITIAL ENCOUNTER: ICD-10-CM

## 2022-08-01 DIAGNOSIS — E11.9 TYPE 2 DIABETES MELLITUS WITHOUT COMPLICATION, WITHOUT LONG-TERM CURRENT USE OF INSULIN (HCC): ICD-10-CM

## 2022-08-01 LAB
A/G RATIO: 1.7 (ref 1.1–2.2)
ALBUMIN SERPL-MCNC: 4.8 G/DL (ref 3.4–5)
ALP BLD-CCNC: 97 U/L (ref 40–129)
ALT SERPL-CCNC: 27 U/L (ref 10–40)
ANION GAP SERPL CALCULATED.3IONS-SCNC: 15 MMOL/L (ref 3–16)
AST SERPL-CCNC: 23 U/L (ref 15–37)
BILIRUB SERPL-MCNC: 1.1 MG/DL (ref 0–1)
BUN BLDV-MCNC: 13 MG/DL (ref 7–20)
CALCIUM SERPL-MCNC: 9.2 MG/DL (ref 8.3–10.6)
CHLORIDE BLD-SCNC: 104 MMOL/L (ref 99–110)
CHOLESTEROL, TOTAL: 108 MG/DL (ref 0–199)
CO2: 26 MMOL/L (ref 21–32)
CREAT SERPL-MCNC: 0.8 MG/DL (ref 0.8–1.3)
GFR AFRICAN AMERICAN: >60
GFR NON-AFRICAN AMERICAN: >60
GLUCOSE BLD-MCNC: 144 MG/DL (ref 70–99)
HBA1C MFR BLD: 8.4 %
HDLC SERPL-MCNC: 35 MG/DL (ref 40–60)
LDL CHOLESTEROL CALCULATED: 51 MG/DL
POTASSIUM SERPL-SCNC: 3.9 MMOL/L (ref 3.5–5.1)
SODIUM BLD-SCNC: 145 MMOL/L (ref 136–145)
TOTAL PROTEIN: 7.6 G/DL (ref 6.4–8.2)
TRIGL SERPL-MCNC: 109 MG/DL (ref 0–150)
VLDLC SERPL CALC-MCNC: 22 MG/DL

## 2022-08-01 PROCEDURE — 99214 OFFICE O/P EST MOD 30 MIN: CPT | Performed by: FAMILY MEDICINE

## 2022-08-01 PROCEDURE — 1123F ACP DISCUSS/DSCN MKR DOCD: CPT | Performed by: FAMILY MEDICINE

## 2022-08-01 PROCEDURE — 3052F HG A1C>EQUAL 8.0%<EQUAL 9.0%: CPT | Performed by: FAMILY MEDICINE

## 2022-08-01 PROCEDURE — 83036 HEMOGLOBIN GLYCOSYLATED A1C: CPT | Performed by: FAMILY MEDICINE

## 2022-08-01 RX ORDER — TIZANIDINE 2 MG/1
2 TABLET ORAL NIGHTLY PRN
Qty: 30 TABLET | Refills: 0 | Status: SHIPPED | OUTPATIENT
Start: 2022-08-01 | End: 2022-09-19 | Stop reason: SDUPTHER

## 2022-08-01 RX ORDER — DULAGLUTIDE 1.5 MG/.5ML
1.5 INJECTION, SOLUTION SUBCUTANEOUS
Qty: 4 PEN | Refills: 11 | Status: SHIPPED | OUTPATIENT
Start: 2022-08-01

## 2022-08-01 ASSESSMENT — PATIENT HEALTH QUESTIONNAIRE - PHQ9
1. LITTLE INTEREST OR PLEASURE IN DOING THINGS: 0
SUM OF ALL RESPONSES TO PHQ QUESTIONS 1-9: 0
SUM OF ALL RESPONSES TO PHQ9 QUESTIONS 1 & 2: 0
SUM OF ALL RESPONSES TO PHQ QUESTIONS 1-9: 0
SUM OF ALL RESPONSES TO PHQ QUESTIONS 1-9: 0
2. FEELING DOWN, DEPRESSED OR HOPELESS: 0
SUM OF ALL RESPONSES TO PHQ QUESTIONS 1-9: 0

## 2022-08-01 NOTE — PROGRESS NOTES
Chief Complaint   Patient presents with    Diabetes    Hypertension    Hyperlipidemia       HPI: Stephanie Cali  presents for evaluation and management of diabetes cholesterol hypertension and shoulder pain. When he notes that he fell about a week or more ago tripping over a fan. He now has some upper back and neck pain. He notes no pain on movement and it bothers him most when he sleeps. He tells me he has not been taking his metformin. He had to stop it for about 3 weeks and he noted he was having a rash on his chest that went away when he stopped it. When he refilled the medicine the rash recurred. He does not want to take that medicine anymore. He is tolerating the Trulicity without side effect. He is taking tolerating his blood pressure medicine without lightheadedness or dizziness. He is taking his Lipitor without abdominal pain or myalgia.         Today's PHQ:    PHQ Scores 8/1/2022 10/25/2021 9/16/2021   PHQ2 Score 0 0 0   PHQ9 Score 0 0 0     Interpretation of Total Score Depression Severity: 1-4 = Minimal depression, 5-9 = Mild depression, 10-14 = Moderate depression, 15-19 = Moderately severe depression, 20-27 = Severe depression        Review of Systems    Allergies   Allergen Reactions    Metformin And Related Rash     New Prescriptions    DULAGLUTIDE (TRULICITY) 1.5 NY/2.2GW SOPN    Inject 1.5 mg into the skin every 7 days     Current Outpatient Medications   Medication Sig Dispense Refill    Dulaglutide (TRULICITY) 1.5 BN/7.2KO SOPN Inject 1.5 mg into the skin every 7 days 4 pen 11    metoprolol succinate (TOPROL XL) 200 MG extended release tablet Take 1 tablet by mouth daily 90 tablet 3    quinapril (ACCUPRIL) 40 MG tablet Take 1 tablet by mouth nightly 90 tablet 3    amLODIPine (NORVASC) 5 MG tablet Take 1 tablet by mouth daily 90 tablet 3    atorvastatin (LIPITOR) 20 MG tablet Take 1 tablet by mouth nightly 90 tablet 3    tamsulosin (FLOMAX) 0.4 MG capsule       hydroCHLOROthiazide CO2 25 10/25/2021 0838    BUN 18 10/25/2021 0838    CREATININE 0.9 10/25/2021 0838        Component Value Date/Time    CALCIUM 9.8 10/25/2021 0838    ALKPHOS 81 10/25/2021 0838    AST 23 10/25/2021 0838    ALT 27 10/25/2021 0838    BILITOT 1.0 10/25/2021 0838          Lab Results   Component Value Date    WBC 11.9 (H) 08/10/2020    HGB 16.0 08/10/2020    HCT 48.0 08/10/2020    MCV 88.4 08/10/2020     08/10/2020     Lab Results   Component Value Date    LABA1C 8.4 08/01/2022     Lab Results   Component Value Date    .1 05/15/2014     Lab Results   Component Value Date    LABA1C 8.4 08/01/2022     No components found for: CHLPL  Lab Results   Component Value Date    TRIG 137 09/16/2021    TRIG 127 10/14/2013    TRIG 180 (H) 12/10/2012     Lab Results   Component Value Date    HDL 43 09/16/2021    HDL 35 (L) 10/14/2013    HDL 38 (L) 12/10/2012     Lab Results   Component Value Date    LDLCALC 83 09/16/2021    LDLCALC 85 10/14/2013    LDLCALC 85 12/10/2012     Lab Results   Component Value Date    LABVLDL 27 09/16/2021    LABVLDL 25 10/14/2013    LABVLDL 36 12/10/2012         Assessment   Plan   1. Type 2 diabetes mellitus without complication, without long-term current use of insulin ([facility])  Hemoglobin A1c was 8.4 today. This is modestly improved but still uncontrolled. We will increase his Trulicity to 1.5 mg weekly and follow-up in 3 months. If still uncontrolled at that time we will titrate his medications again. He notes that he could probably make some improvements on his diet  - Lipid Panel; Future  - Comprehensive Metabolic Panel; Future  - POCT glycosylated hemoglobin (Hb A1C)  - Dulaglutide (TRULICITY) 1.5 GA/4.6JT SOPN; Inject 1.5 mg into the skin every 7 days  Dispense: 4 pen; Refill: 11    2. Essential hypertension, benign  Controlled: Appears stable. We will continue current management and monitor for adverse reaction and disease progression.   Follow-up as noted below    - Comprehensive Metabolic Panel; Future    3. Strain of neck muscle, initial encounter  We will trial Zanaflex and follow-up in 3 months sooner if worsening  - tiZANidine (ZANAFLEX) 2 MG tablet; Take 1 tablet by mouth nightly as needed (muscle spasms)  Dispense: 30 tablet; Refill: 0        RTC Return in about 3 months (around 11/1/2022).

## 2022-08-02 NOTE — RESULT ENCOUNTER NOTE
Good Morning Farwell ,    It was good to see you yesterday. Your lab results are back and they look ok. I wanted you to know that I tested positive for Covid yesterday afternoon (started feeling bad while at the office). ... if you start to get symptoms, get tested and let me know if you are postive and I will send you in medication. Desiree Flores Keep up the good work!     Take care,    Dr. Lyle Olson      For your convenience I have listed your future appointment(s) below:  Future Appointments  11/7/2022  7:45 AM    MD ROSELYN Oliver

## 2022-09-18 DIAGNOSIS — S16.1XXA STRAIN OF NECK MUSCLE, INITIAL ENCOUNTER: ICD-10-CM

## 2022-09-19 DIAGNOSIS — S16.1XXA STRAIN OF NECK MUSCLE, INITIAL ENCOUNTER: ICD-10-CM

## 2022-09-19 RX ORDER — TIZANIDINE 2 MG/1
2 TABLET ORAL NIGHTLY PRN
Qty: 30 TABLET | Refills: 3 | Status: SHIPPED | OUTPATIENT
Start: 2022-09-19

## 2022-09-19 RX ORDER — TIZANIDINE 2 MG/1
TABLET ORAL
Qty: 30 TABLET | Refills: 3 | OUTPATIENT
Start: 2022-09-19

## 2022-09-19 NOTE — TELEPHONE ENCOUNTER
Medication:   Requested Prescriptions     Pending Prescriptions Disp Refills    tiZANidine (ZANAFLEX) 2 MG tablet [Pharmacy Med Name: tiZANidine HCL 2MG TABLET] 30 tablet 3     Sig: TAKE ONE TABLET BY MOUTH ONCE NIGHTLY AS NEEDED FOR MUSCLE SPASMS       Last Filled:      Patient Phone Number: [phone] (home)     Last appt: 8/1/2022   Next appt: 11/7/2022  Return in about 3 months (around 11/1/2022).   Last PSA: No results found for: PSA

## 2022-10-25 NOTE — PROGRESS NOTES
Medication:   Requested Prescriptions     Pending Prescriptions Disp Refills    tiZANidine (ZANAFLEX) 2 MG tablet 30 tablet 3     Sig: Take 1 tablet by mouth nightly as needed (muscle spasms)        Last Filled:      Patient Phone Number: [phone] (home)     Last appt: 8/1/2022   Next appt: 11/7/2022  Return in about 3 months (around 11/1/2022). Last OARRS: No flowsheet data found. No

## 2022-11-07 ENCOUNTER — OFFICE VISIT (OUTPATIENT)
Dept: PRIMARY CARE CLINIC | Age: 72
End: 2022-11-07
Payer: COMMERCIAL

## 2022-11-07 VITALS — BODY MASS INDEX: 31.25 KG/M2 | SYSTOLIC BLOOD PRESSURE: 138 MMHG | WEIGHT: 217.8 LBS | DIASTOLIC BLOOD PRESSURE: 70 MMHG

## 2022-11-07 DIAGNOSIS — G47.33 OBSTRUCTIVE SLEEP APNEA SYNDROME: ICD-10-CM

## 2022-11-07 DIAGNOSIS — I10 ESSENTIAL HYPERTENSION, BENIGN: ICD-10-CM

## 2022-11-07 DIAGNOSIS — Z23 NEED FOR VACCINATION: ICD-10-CM

## 2022-11-07 DIAGNOSIS — E11.9 TYPE 2 DIABETES MELLITUS WITHOUT COMPLICATION, WITHOUT LONG-TERM CURRENT USE OF INSULIN (HCC): Primary | ICD-10-CM

## 2022-11-07 LAB — HBA1C MFR BLD: 9.7 %

## 2022-11-07 PROCEDURE — 3074F SYST BP LT 130 MM HG: CPT | Performed by: FAMILY MEDICINE

## 2022-11-07 PROCEDURE — 83036 HEMOGLOBIN GLYCOSYLATED A1C: CPT | Performed by: FAMILY MEDICINE

## 2022-11-07 PROCEDURE — 1123F ACP DISCUSS/DSCN MKR DOCD: CPT | Performed by: FAMILY MEDICINE

## 2022-11-07 PROCEDURE — 99214 OFFICE O/P EST MOD 30 MIN: CPT | Performed by: FAMILY MEDICINE

## 2022-11-07 PROCEDURE — 3052F HG A1C>EQUAL 8.0%<EQUAL 9.0%: CPT | Performed by: FAMILY MEDICINE

## 2022-11-07 PROCEDURE — 3078F DIAST BP <80 MM HG: CPT | Performed by: FAMILY MEDICINE

## 2022-11-07 RX ORDER — HYDROCHLOROTHIAZIDE 12.5 MG/1
12.5 CAPSULE, GELATIN COATED ORAL EVERY MORNING
Qty: 90 CAPSULE | Refills: 3 | Status: SHIPPED | OUTPATIENT
Start: 2022-11-07 | End: 2022-11-28

## 2022-11-07 RX ORDER — PIOGLITAZONEHYDROCHLORIDE 30 MG/1
30 TABLET ORAL DAILY
Qty: 30 TABLET | Refills: 3 | Status: SHIPPED | OUTPATIENT
Start: 2022-11-07

## 2022-11-07 RX ORDER — TAMSULOSIN HYDROCHLORIDE 0.4 MG/1
0.4 CAPSULE ORAL EVERY EVENING
Qty: 90 CAPSULE | Refills: 3 | Status: SHIPPED | OUTPATIENT
Start: 2022-11-07

## 2022-11-07 NOTE — PROGRESS NOTES
Chief Complaint   Patient presents with    Diabetes         HPI: Octavia Rollins  presents for evaluation and management of his, hypertension and cholesterol. What he notes that he is taking and tolerating his diabetes medications. He did come off the metformin due to a rash at last visit. Rash is improved. He notes the Trulicity has decreased his appetite. He admits he is urinating more frequently. He eats a bagel for breakfast, a normal lunch and a very light dinner. He continues to live in an apartment and is working on moving out to [place]. Reports she is taking tolerating his blood pressure medicine without lightheadedness or dizziness.     He reports his tolerating his Lipitor without abdominal pain or myalgia           Review of Systems    Allergies   Allergen Reactions    Metformin And Related Rash     New Prescriptions    PIOGLITAZONE (ACTOS) 30 MG TABLET    Take 1 tablet by mouth daily     Current Outpatient Medications   Medication Sig Dispense Refill    tamsulosin (FLOMAX) 0.4 MG capsule Take 1 capsule by mouth every evening 90 capsule 3    hydroCHLOROthiazide (MICROZIDE) 12.5 MG capsule Take 1 capsule by mouth every morning 90 capsule 3    empagliflozin (JARDIANCE) 25 MG tablet TAKE ONE TABLET BY MOUTH DAILY 30 tablet 11    pioglitazone (ACTOS) 30 MG tablet Take 1 tablet by mouth daily 30 tablet 3    tiZANidine (ZANAFLEX) 2 MG tablet Take 1 tablet by mouth nightly as needed (muscle spasms) 30 tablet 3    Dulaglutide (TRULICITY) 1.5 BB/7.2HH SOPN Inject 1.5 mg into the skin every 7 days 4 pen 11    metoprolol succinate (TOPROL XL) 200 MG extended release tablet Take 1 tablet by mouth daily 90 tablet 3    quinapril (ACCUPRIL) 40 MG tablet Take 1 tablet by mouth nightly 90 tablet 3    amLODIPine (NORVASC) 5 MG tablet Take 1 tablet by mouth daily 90 tablet 3    SILDENAFIL CITRATE PO Take 100 mg by mouth daily as needed      atorvastatin (LIPITOR) 20 MG tablet Take 1 tablet by mouth nightly 90 tablet 3 silodosin (RAPAFLO) 8 MG CAPS  (Patient not taking: No sig reported)       No current facility-administered medications for this visit. Past Medical History:   Diagnosis Date    Diabetes mellitus ([address]     Diverticulosis 06/01/2005    Colonoscopy    Hypertension     Sleep apnea 01/21/2013    Severe CPAP at 11 cwp: non-compliant but informed         Objective   Wt 217 lb 12.8 oz (98.8 kg)   BMI 31.25 kg/m²   Wt Readings from Last 3 Encounters:   11/07/22 217 lb 12.8 oz (98.8 kg)   08/01/22 217 lb 6.4 oz (98.6 kg)   04/27/22 221 lb 12.8 oz (100.6 kg)       Physical Exam  Constitutional:       Appearance: He is well-developed. Cardiovascular:      Rate and Rhythm: Normal rate and regular rhythm. Heart sounds: No murmur heard. No friction rub. No gallop. Pulmonary:      Effort: Pulmonary effort is normal.      Breath sounds: Normal breath sounds. No wheezing or rales. Abdominal:      General: Bowel sounds are normal. There is no distension. Palpations: Abdomen is soft. There is no mass. Tenderness: There is no abdominal tenderness. Skin:     General: Skin is warm and dry. Findings: No rash. Neurological:      Comments: Foot Exam: Skin warm, dry and intact w/o callus or erythema.   Sensation intact to 10gm monofilament           Chemistry        Component Value Date/Time     08/01/2022 0941    K 3.9 08/01/2022 0941     08/01/2022 0941    CO2 26 08/01/2022 0941    BUN 13 08/01/2022 0941    CREATININE 0.8 08/01/2022 0941        Component Value Date/Time    CALCIUM 9.2 08/01/2022 0941    ALKPHOS 97 08/01/2022 0941    AST 23 08/01/2022 0941    ALT 27 08/01/2022 0941    BILITOT 1.1 (H) 08/01/2022 0941          Lab Results   Component Value Date    WBC 11.9 (H) 08/10/2020    HGB 16.0 08/10/2020    HCT 48.0 08/10/2020    MCV 88.4 08/10/2020     08/10/2020     Lab Results   Component Value Date    LABA1C 8.4 08/01/2022     Lab Results   Component Value Date    .1 05/15/2014     Lab Results   Component Value Date    LABA1C 8.4 08/01/2022     No components found for: CHLPL  Lab Results   Component Value Date    TRIG 109 08/01/2022    TRIG 137 09/16/2021    TRIG 127 10/14/2013     Lab Results   Component Value Date    HDL 35 (L) 08/01/2022    HDL 43 09/16/2021    HDL 35 (L) 10/14/2013     Lab Results   Component Value Date    LDLCALC 51 08/01/2022    LDLCALC 83 09/16/2021    LDLCALC 85 10/14/2013     Lab Results   Component Value Date    LABVLDL 22 08/01/2022    LABVLDL 27 09/16/2021    LABVLDL 25 10/14/2013         Assessment   Plan   1. Type 2 diabetes mellitus without complication, without long-term current use of insulin ([facility])  Hemoglobin A1c was 9.7 today. We will titrate up his medication with the addition of Actos and follow-up in 3 months  His A1c is elevated. He is already on lisinopril  - POCT glycosylated hemoglobin (Hb A1C)  - Microalbumin / Creatinine Urine Ratio  -  DIABETES FOOT EXAM  - empagliflozin (JARDIANCE) 25 MG tablet; TAKE ONE TABLET BY MOUTH DAILY  Dispense: 30 tablet; Refill: 11  - pioglitazone (ACTOS) 30 MG tablet; Take 1 tablet by mouth daily  Dispense: 30 tablet; Refill: 3    2. Essential hypertension, benign  Marginal control. Continue current meds and follow-up in 3 months  - hydroCHLOROthiazide (MICROZIDE) 12.5 MG capsule; Take 1 capsule by mouth every morning  Dispense: 90 capsule; Refill: 3    3. Obstructive sleep apnea syndrome  . Stable: Encouraged weight loss. Follow-up 3 months    4. Need for vaccination  Patient states he is already been vaccinate        RTC Return in about 3 months (around 2/7/2023).

## 2022-11-24 DIAGNOSIS — I10 ESSENTIAL HYPERTENSION, BENIGN: ICD-10-CM

## 2022-11-28 RX ORDER — HYDROCHLOROTHIAZIDE 12.5 MG/1
CAPSULE, GELATIN COATED ORAL
Qty: 90 CAPSULE | Refills: 3 | Status: SHIPPED | OUTPATIENT
Start: 2022-11-28

## 2022-11-28 NOTE — TELEPHONE ENCOUNTER
Medication:   Requested Prescriptions     Pending Prescriptions Disp Refills    hydroCHLOROthiazide (MICROZIDE) 12.5 MG capsule [Pharmacy Med Name: HYDROCHLOROT CAP 12.5MG] 90 capsule 3     Sig: TAKE 1 CAPSULE EVERY       MORNING       Last Filled:      Patient Phone Number: [phone] (home)     Last appt: 11/7/2022   Next appt: 2/13/2023  Return in about 3 months (around 2/7/2023).   Last PSA: No results found for: PSA

## 2022-12-20 ENCOUNTER — OFFICE VISIT (OUTPATIENT)
Dept: PRIMARY CARE CLINIC | Age: 72
End: 2022-12-20
Payer: COMMERCIAL

## 2022-12-20 VITALS
WEIGHT: 216 LBS | HEIGHT: 70 IN | HEART RATE: 88 BPM | OXYGEN SATURATION: 97 % | BODY MASS INDEX: 30.92 KG/M2 | DIASTOLIC BLOOD PRESSURE: 78 MMHG | SYSTOLIC BLOOD PRESSURE: 124 MMHG | TEMPERATURE: 97.3 F

## 2022-12-20 DIAGNOSIS — E11.9 TYPE 2 DIABETES MELLITUS WITHOUT COMPLICATION, WITHOUT LONG-TERM CURRENT USE OF INSULIN (HCC): ICD-10-CM

## 2022-12-20 DIAGNOSIS — L03.115 CELLULITIS OF RIGHT LOWER EXTREMITY: Primary | ICD-10-CM

## 2022-12-20 DIAGNOSIS — I10 ESSENTIAL HYPERTENSION, BENIGN: ICD-10-CM

## 2022-12-20 PROCEDURE — 3046F HEMOGLOBIN A1C LEVEL >9.0%: CPT | Performed by: FAMILY MEDICINE

## 2022-12-20 PROCEDURE — 3074F SYST BP LT 130 MM HG: CPT | Performed by: FAMILY MEDICINE

## 2022-12-20 PROCEDURE — 3078F DIAST BP <80 MM HG: CPT | Performed by: FAMILY MEDICINE

## 2022-12-20 PROCEDURE — 1123F ACP DISCUSS/DSCN MKR DOCD: CPT | Performed by: FAMILY MEDICINE

## 2022-12-20 PROCEDURE — 99214 OFFICE O/P EST MOD 30 MIN: CPT | Performed by: FAMILY MEDICINE

## 2022-12-20 RX ORDER — GLIPIZIDE 10 MG/1
10 TABLET, FILM COATED, EXTENDED RELEASE ORAL DAILY
Qty: 30 TABLET | Refills: 3 | Status: SHIPPED | OUTPATIENT
Start: 2022-12-20

## 2022-12-20 NOTE — PROGRESS NOTES
Chief Complaint   Patient presents with    Hypertension    Cholesterol Problem       HPI: Yong Shine  presents for evaluation and management of cellulitis. When he notes that he was having some increased pain in his right ankle and foot and leg 4 days ago. 2 days ago he was having chills so he started taking some amoxicillin which she had at his home. He sent me a [employer] message yesterday and I started him on Keflex. He notes that his chills and pain have resolved but the leg is still red. He also noted some proximal right groin redness that has resolved. He has a history of poorly controlled diabetes and is not checking his blood sugars. He reports compliance with his diabetes medications however.     He is taking and tolerating his blood pressure medicine           Review of Systems    Allergies   Allergen Reactions    Metformin And Related Rash     New Prescriptions    GLIPIZIDE (GLUCOTROL XL) 10 MG EXTENDED RELEASE TABLET    Take 1 tablet by mouth daily     Current Outpatient Medications   Medication Sig Dispense Refill    glipiZIDE (GLUCOTROL XL) 10 MG extended release tablet Take 1 tablet by mouth daily 30 tablet 3    cephALEXin (KEFLEX) 500 MG capsule Take 1 capsule by mouth 4 times daily for 10 days 40 capsule 0    hydroCHLOROthiazide (MICROZIDE) 12.5 MG capsule TAKE 1 CAPSULE EVERY       MORNING 90 capsule 3    tamsulosin (FLOMAX) 0.4 MG capsule Take 1 capsule by mouth every evening 90 capsule 3    empagliflozin (JARDIANCE) 25 MG tablet TAKE ONE TABLET BY MOUTH DAILY 30 tablet 11    pioglitazone (ACTOS) 30 MG tablet Take 1 tablet by mouth daily 30 tablet 3    tiZANidine (ZANAFLEX) 2 MG tablet Take 1 tablet by mouth nightly as needed (muscle spasms) 30 tablet 3    Dulaglutide (TRULICITY) 1.5 XG/8.4KP SOPN Inject 1.5 mg into the skin every 7 days 4 pen 11    metoprolol succinate (TOPROL XL) 200 MG extended release tablet Take 1 tablet by mouth daily 90 tablet 3    quinapril (ACCUPRIL) 40 MG tablet Take 1 tablet by mouth nightly 90 tablet 3    amLODIPine (NORVASC) 5 MG tablet Take 1 tablet by mouth daily 90 tablet 3    SILDENAFIL CITRATE PO Take 100 mg by mouth daily as needed      silodosin (RAPAFLO) 8 MG CAPS       atorvastatin (LIPITOR) 20 MG tablet Take 1 tablet by mouth nightly 90 tablet 3     No current facility-administered medications for this visit. Past Medical History:   Diagnosis Date    Diabetes mellitus ([facility] [address]     Diverticulosis 06/01/2005    Colonoscopy    Hypertension     Sleep apnea 01/21/2013    Severe CPAP at 11 cwp: non-compliant but informed         Objective   /78   Pulse 88   Temp 97.3 °F (36.3 °C)   Ht 5' 10\" (1.778 m)   Wt 216 lb (98 kg)   SpO2 97%   BMI 30.99 kg/m²   Wt Readings from Last 3 Encounters:   12/20/22 216 lb (98 kg)   11/07/22 217 lb 12.8 oz (98.8 kg)   08/01/22 217 lb 6.4 oz (98.6 kg)       Physical Exam  Constitutional:       Appearance: He is well-developed. Cardiovascular:      Rate and Rhythm: Normal rate and regular rhythm. Heart sounds: No murmur heard. No friction rub. No gallop. Pulmonary:      Effort: Pulmonary effort is normal.      Breath sounds: Normal breath sounds. No wheezing or rales. Abdominal:      General: Bowel sounds are normal. There is no distension. Palpations: Abdomen is soft. There is no mass. Tenderness: There is no abdominal tenderness. Skin:     General: Skin is warm and dry. Findings: Erythema and rash present. Comments: Erythema of distal leg to below ankle and up to proximal third of the leg. Circumscribed with marker today. Nontender. There is no erythema of his medial thigh that was noted yesterday.          Chemistry        Component Value Date/Time     08/01/2022 0941    K 3.9 08/01/2022 0941     08/01/2022 0941    CO2 26 08/01/2022 0941    BUN 13 08/01/2022 0941    CREATININE 0.8 08/01/2022 0941        Component Value Date/Time    CALCIUM 9.2 08/01/2022 0941    ALKPHOS 97 08/01/2022 0941    AST 23 08/01/2022 0941    ALT 27 08/01/2022 0941    BILITOT 1.1 (H) 08/01/2022 0941          Lab Results   Component Value Date    WBC 11.9 (H) 08/10/2020    HGB 16.0 08/10/2020    HCT 48.0 08/10/2020    MCV 88.4 08/10/2020     08/10/2020     Lab Results   Component Value Date    LABA1C 9.7 11/07/2022     Lab Results   Component Value Date    .1 05/15/2014     Lab Results   Component Value Date    LABA1C 9.7 11/07/2022     No components found for: CHLPL  Lab Results   Component Value Date    TRIG 109 08/01/2022    TRIG 137 09/16/2021    TRIG 127 10/14/2013     Lab Results   Component Value Date    HDL 35 (L) 08/01/2022    HDL 43 09/16/2021    HDL 35 (L) 10/14/2013     Lab Results   Component Value Date    LDLCALC 51 08/01/2022    LDLCALC 83 09/16/2021    LDLCALC 85 10/14/2013     Lab Results   Component Value Date    LABVLDL 22 08/01/2022    LABVLDL 27 09/16/2021    LABVLDL 25 10/14/2013         Assessment   Plan   1. Cellulitis of right lower extremity  Appears to be improving however I am very concerned with his elevated blood sugar about the possibility of Bala's fasciitis and sepsis. Asked patient to check his blood sugars twice a day and to call if he develops fever. We will recheck in 2 weeks should everything go well    2. Type 2 diabetes mellitus without complication, without long-term current use of insulin (HCC)  Uncontrolled: Hemoglobin A1c was 11.7 today. I counseled patient that this is very poor control and that he is at high risk for complications from infection when his blood sugars are over 200. He will check his blood sugars at least twice a day as we are starting been on glipizide and he will follow-up with how he is doing on Thursday  - glipiZIDE (GLUCOTROL XL) 10 MG extended release tablet; Take 1 tablet by mouth daily  Dispense: 30 tablet; Refill: 3    3. Essential hypertension, benign  Controlled: Appears stable.   We will continue current management and monitor for adverse reaction and disease progression. Follow-up as noted below          RTC Return in about 2 weeks (around 1/3/2023).

## 2022-12-21 NOTE — TELEPHONE ENCOUNTER
Medication:   Requested Prescriptions     Pending Prescriptions Disp Refills    empagliflozin (JARDIANCE) 25 MG tablet [Pharmacy Med Name: Raj Monreal 25 MG TABLET] 30 tablet 11     Sig: TAKE ONE TABLET BY MOUTH DAILY       Last Filled:      Patient Phone Number: [phone] (home)     Last appt: 12/20/2022   Next appt: 1/3/2023  Return in about 2 weeks (around 1/3/2023).   Last PSA: No results found for: PSA

## 2022-12-22 ENCOUNTER — PATIENT MESSAGE (OUTPATIENT)
Dept: PRIMARY CARE CLINIC | Age: 72
End: 2022-12-22

## 2022-12-22 NOTE — TELEPHONE ENCOUNTER
From: Fran Storey  To: Dr. Dinesh Damon: 12/22/2022 8:47 AM EST  Subject: Cellulitis/Diabetes issues     In the last two days does the cellulitis appears to be subsiding and I no longer painful. The area is less red. Diabetes control seams to be better not great while fasting. 12/22/2022 (287).  12/23/2023. (243)    Happy Holidays     Lynsey Robertson

## 2022-12-29 ENCOUNTER — TELEPHONE (OUTPATIENT)
Dept: PRIMARY CARE CLINIC | Age: 72
End: 2022-12-29

## 2022-12-29 DIAGNOSIS — I10 ESSENTIAL HYPERTENSION, BENIGN: Primary | ICD-10-CM

## 2022-12-29 RX ORDER — LISINOPRIL 20 MG/1
20 TABLET ORAL DAILY
Qty: 90 TABLET | Refills: 1 | Status: SHIPPED | OUTPATIENT
Start: 2022-12-29

## 2022-12-29 NOTE — TELEPHONE ENCOUNTER
Call patient and let him know that I would like to  switch to lisinopril as his other ACE inhibitor is on backorder. I sent in a prescription of lisinopril to the [facility] mail order for him.

## 2022-12-29 NOTE — TELEPHONE ENCOUNTER
[facility] called and quinapril (ACCUPRIL) 40 MG tablet   Is on back order, do not know when they will get back in.

## 2022-12-30 ENCOUNTER — TELEPHONE (OUTPATIENT)
Dept: PRIMARY CARE CLINIC | Age: 72
End: 2022-12-30

## 2022-12-30 NOTE — TELEPHONE ENCOUNTER
quinapril (ACCUPRIL) 40 MG tablet     [facility] [address] [facility], [address] [place] [address],[address] [phone] Jd Li [id number]   Phone:  [phone]  Fax:  [phone]    The Pharmacy call in stating that this medication is on back order an do not know when it would be in and was wondering if you would like to send in an alternate medication.

## 2023-01-03 ENCOUNTER — OFFICE VISIT (OUTPATIENT)
Dept: PRIMARY CARE CLINIC | Age: 73
End: 2023-01-03
Payer: COMMERCIAL

## 2023-01-03 VITALS
HEIGHT: 70 IN | HEART RATE: 106 BPM | WEIGHT: 210.8 LBS | DIASTOLIC BLOOD PRESSURE: 97 MMHG | BODY MASS INDEX: 30.18 KG/M2 | TEMPERATURE: 97.9 F | SYSTOLIC BLOOD PRESSURE: 145 MMHG

## 2023-01-03 DIAGNOSIS — I10 ESSENTIAL HYPERTENSION, BENIGN: ICD-10-CM

## 2023-01-03 DIAGNOSIS — E11.9 TYPE 2 DIABETES MELLITUS WITHOUT COMPLICATION, WITHOUT LONG-TERM CURRENT USE OF INSULIN (HCC): Primary | ICD-10-CM

## 2023-01-03 DIAGNOSIS — L03.115 CELLULITIS OF RIGHT LOWER EXTREMITY: ICD-10-CM

## 2023-01-03 PROCEDURE — 99214 OFFICE O/P EST MOD 30 MIN: CPT | Performed by: FAMILY MEDICINE

## 2023-01-03 PROCEDURE — 83036 HEMOGLOBIN GLYCOSYLATED A1C: CPT | Performed by: FAMILY MEDICINE

## 2023-01-03 PROCEDURE — 3077F SYST BP >= 140 MM HG: CPT | Performed by: FAMILY MEDICINE

## 2023-01-03 PROCEDURE — 3080F DIAST BP >= 90 MM HG: CPT | Performed by: FAMILY MEDICINE

## 2023-01-03 PROCEDURE — 1123F ACP DISCUSS/DSCN MKR DOCD: CPT | Performed by: FAMILY MEDICINE

## 2023-01-03 RX ORDER — CEPHALEXIN 500 MG/1
500 CAPSULE ORAL 4 TIMES DAILY
Qty: 40 CAPSULE | Refills: 0 | Status: SHIPPED | OUTPATIENT
Start: 2023-01-03 | End: 2023-01-13

## 2023-01-03 RX ORDER — SULFAMETHOXAZOLE AND TRIMETHOPRIM 800; 160 MG/1; MG/1
1 TABLET ORAL 2 TIMES DAILY
Qty: 20 TABLET | Refills: 0 | Status: SHIPPED | OUTPATIENT
Start: 2023-01-03 | End: 2023-01-13

## 2023-01-03 RX ORDER — INSULIN GLARGINE 100 [IU]/ML
10 INJECTION, SOLUTION SUBCUTANEOUS NIGHTLY
Qty: 5 ADJUSTABLE DOSE PRE-FILLED PEN SYRINGE | Refills: 1 | Status: SHIPPED | OUTPATIENT
Start: 2023-01-03

## 2023-01-03 NOTE — PROGRESS NOTES
Chief Complaint   Patient presents with    Hypertension    Diabetes       HPI: Ju Patel  presents for evaluation and management of cellulitis, diabetes and hypertension. Ju Patel notes that his leg is better but not resolved. He still has redness and tenderness on his right anterior shin. He took all of his Keflex and notes that it never completely went away. He has not been able to check his blood sugars consistently. He checked them intermittently over the last couple weeks and they were running between 210 and 280. He has been taking and tolerating his medication as directed. Notes no diaphoresis tremulousness or anxiety.     Has been taking tolerating his blood pressure medicine      Review of Systems    Allergies   Allergen Reactions    Metformin And Related Rash     New Prescriptions    CEPHALEXIN (KEFLEX) 500 MG CAPSULE    Take 1 capsule by mouth 4 times daily for 10 days    INSULIN GLARGINE (LANTUS SOLOSTAR) 100 UNIT/ML INJECTION PEN    Inject 10 Units into the skin nightly    SULFAMETHOXAZOLE-TRIMETHOPRIM (BACTRIM DS) 800-160 MG PER TABLET    Take 1 tablet by mouth 2 times daily for 10 days     Current Outpatient Medications   Medication Sig Dispense Refill    sulfamethoxazole-trimethoprim (BACTRIM DS) 800-160 MG per tablet Take 1 tablet by mouth 2 times daily for 10 days 20 tablet 0    cephALEXin (KEFLEX) 500 MG capsule Take 1 capsule by mouth 4 times daily for 10 days 40 capsule 0    insulin glargine (LANTUS SOLOSTAR) 100 UNIT/ML injection pen Inject 10 Units into the skin nightly 5 Adjustable Dose Pre-filled Pen Syringe 1    lisinopril (PRINIVIL;ZESTRIL) 20 MG tablet Take 1 tablet by mouth daily 90 tablet 1    empagliflozin (JARDIANCE) 25 MG tablet TAKE ONE TABLET BY MOUTH DAILY 30 tablet 11    glipiZIDE (GLUCOTROL XL) 10 MG extended release tablet Take 1 tablet by mouth daily 30 tablet 3    hydroCHLOROthiazide (MICROZIDE) 12.5 MG capsule TAKE 1 CAPSULE EVERY       MORNING 90 capsule 3    tamsulosin (FLOMAX) 0.4 MG capsule Take 1 capsule by mouth every evening 90 capsule 3    pioglitazone (ACTOS) 30 MG tablet Take 1 tablet by mouth daily 30 tablet 3    tiZANidine (ZANAFLEX) 2 MG tablet Take 1 tablet by mouth nightly as needed (muscle spasms) 30 tablet 3    Dulaglutide (TRULICITY) 1.5 GL/2.9TQ SOPN Inject 1.5 mg into the skin every 7 days 4 pen 11    metoprolol succinate (TOPROL XL) 200 MG extended release tablet Take 1 tablet by mouth daily 90 tablet 3    quinapril (ACCUPRIL) 40 MG tablet Take 1 tablet by mouth nightly 90 tablet 3    amLODIPine (NORVASC) 5 MG tablet Take 1 tablet by mouth daily 90 tablet 3    SILDENAFIL CITRATE PO Take 100 mg by mouth daily as needed      silodosin (RAPAFLO) 8 MG CAPS       atorvastatin (LIPITOR) 20 MG tablet Take 1 tablet by mouth nightly 90 tablet 3     No current facility-administered medications for this visit. Past Medical History:   Diagnosis Date    Diabetes mellitus ([facility] [address]     Diverticulosis 06/01/2005    Colonoscopy    Hypertension     Sleep apnea 01/21/2013    Severe CPAP at 11 cwp: non-compliant but informed         Objective   BP (!) 145/97   Pulse (!) 106   Temp 97.9 °F (36.6 °C)   Ht 5' 10\" (1.778 m)   Wt 210 lb 12.8 oz (95.6 kg)   BMI 30.25 kg/m²   Wt Readings from Last 3 Encounters:   01/03/23 210 lb 12.8 oz (95.6 kg)   12/20/22 216 lb (98 kg)   11/07/22 217 lb 12.8 oz (98.8 kg)       Physical Exam  Constitutional:       Appearance: He is well-developed. Cardiovascular:      Rate and Rhythm: Normal rate and regular rhythm. Pulses:           Dorsalis pedis pulses are 2+ on the right side and 2+ on the left side. Posterior tibial pulses are 2+ on the right side and 2+ on the left side. Heart sounds: No murmur heard. No friction rub. No gallop. Comments: No Lower Extremity Edema  Pulmonary:      Effort: Pulmonary effort is normal.      Breath sounds: Normal breath sounds. No wheezing or rales.    Abdominal:      Palpations: Abdomen is soft. Skin:     General: Skin is warm and dry. Findings: Erythema and rash present. Chemistry        Component Value Date/Time     08/01/2022 0941    K 3.9 08/01/2022 0941     08/01/2022 0941    CO2 26 08/01/2022 0941    BUN 13 08/01/2022 0941    CREATININE 0.8 08/01/2022 0941        Component Value Date/Time    CALCIUM 9.2 08/01/2022 0941    ALKPHOS 97 08/01/2022 0941    AST 23 08/01/2022 0941    ALT 27 08/01/2022 0941    BILITOT 1.1 (H) 08/01/2022 0941          Lab Results   Component Value Date    WBC 11.9 (H) 08/10/2020    HGB 16.0 08/10/2020    HCT 48.0 08/10/2020    MCV 88.4 08/10/2020     08/10/2020     Lab Results   Component Value Date    LABA1C 9.7 11/07/2022     Lab Results   Component Value Date    .1 05/15/2014     Lab Results   Component Value Date    LABA1C 9.7 11/07/2022     No components found for: CHLPL  Lab Results   Component Value Date    TRIG 109 08/01/2022    TRIG 137 09/16/2021    TRIG 127 10/14/2013     Lab Results   Component Value Date    HDL 35 (L) 08/01/2022    HDL 43 09/16/2021    HDL 35 (L) 10/14/2013     Lab Results   Component Value Date    LDLCALC 51 08/01/2022    LDLCALC 83 09/16/2021    LDLCALC 85 10/14/2013     Lab Results   Component Value Date    LABVLDL 22 08/01/2022    LABVLDL 27 09/16/2021    LABVLDL 25 10/14/2013         Assessment   Plan   1. Type 2 diabetes mellitus without complication, without long-term current use of insulin (HCC)  Uncontrolled. Puts him at risk for complications and inability to fight his infection adequately. I gave him a Dexcom on 6 sample and we will start him on insulin. Asked him to notify me if his blood sugars go below 120. Follow-up in 9 days  - Comprehensive Metabolic Panel; Future  - POCT glycosylated hemoglobin (Hb A1C)  - insulin glargine (LANTUS SOLOSTAR) 100 UNIT/ML injection pen; Inject 10 Units into the skin nightly  Dispense: 5 Adjustable Dose Pre-filled Pen Syringe;  Refill: 1    2. Cellulitis of right lower extremity  Unresolved. We will repeat Keflex and add Bactrim and follow-up in 9 days  - sulfamethoxazole-trimethoprim (BACTRIM DS) 800-160 MG per tablet; Take 1 tablet by mouth 2 times daily for 10 days  Dispense: 20 tablet; Refill: 0  - cephALEXin (KEFLEX) 500 MG capsule; Take 1 capsule by mouth 4 times daily for 10 days  Dispense: 40 capsule; Refill: 0    3. Essential hypertension, benign  Controlled: May have component of underlying infection contributing. Follow-up 9 days  - Comprehensive Metabolic Panel; Future        RTC Return in about 9 days (around 1/12/2023).

## 2023-01-04 DIAGNOSIS — E11.9 TYPE 2 DIABETES MELLITUS WITHOUT COMPLICATION, WITH LONG-TERM CURRENT USE OF INSULIN (HCC): Primary | ICD-10-CM

## 2023-01-04 DIAGNOSIS — Z79.4 TYPE 2 DIABETES MELLITUS WITHOUT COMPLICATION, WITH LONG-TERM CURRENT USE OF INSULIN (HCC): Primary | ICD-10-CM

## 2023-01-04 RX ORDER — BLOOD-GLUCOSE SENSOR
1 EACH MISCELLANEOUS
Qty: 3 EACH | Refills: 3 | Status: SHIPPED | OUTPATIENT
Start: 2023-01-04 | End: 2023-02-04

## 2023-01-04 RX ORDER — BLOOD-GLUCOSE TRANSMITTER
1 EACH MISCELLANEOUS
Qty: 1 EACH | Refills: 1 | Status: SHIPPED | OUTPATIENT
Start: 2023-01-04 | End: 2023-04-04

## 2023-01-04 NOTE — PROGRESS NOTES
Medication:   Requested Prescriptions     Pending Prescriptions Disp Refills    Continuous Blood Gluc Sensor (DEXCOM G6 SENSOR) MISC 3 each 3     Sig: Inject 1 applicator into the skin every 10 days    Continuous Blood Gluc Transmit (DEXCOM G6 TRANSMITTER) MISC 1 each 1     Si applicator by Does not apply route every 3 months        Last Filled:  New Script    Patient Phone Number: [phone] (home)     Last appt: 1/3/2023   Next appt: 2023    Last OARRS: No flowsheet data found.

## 2023-01-12 ENCOUNTER — OFFICE VISIT (OUTPATIENT)
Dept: PRIMARY CARE CLINIC | Age: 73
End: 2023-01-12
Payer: COMMERCIAL

## 2023-01-12 VITALS
SYSTOLIC BLOOD PRESSURE: 98 MMHG | BODY MASS INDEX: 30.61 KG/M2 | HEART RATE: 71 BPM | DIASTOLIC BLOOD PRESSURE: 62 MMHG | WEIGHT: 213.8 LBS | TEMPERATURE: 97.1 F | HEIGHT: 70 IN

## 2023-01-12 DIAGNOSIS — I10 ESSENTIAL HYPERTENSION, BENIGN: ICD-10-CM

## 2023-01-12 DIAGNOSIS — Z79.4 TYPE 2 DIABETES MELLITUS WITHOUT COMPLICATION, WITH LONG-TERM CURRENT USE OF INSULIN (HCC): Primary | ICD-10-CM

## 2023-01-12 DIAGNOSIS — L03.115 CELLULITIS OF RIGHT LOWER EXTREMITY: ICD-10-CM

## 2023-01-12 DIAGNOSIS — E11.9 TYPE 2 DIABETES MELLITUS WITHOUT COMPLICATION, WITH LONG-TERM CURRENT USE OF INSULIN (HCC): Primary | ICD-10-CM

## 2023-01-12 PROCEDURE — 1123F ACP DISCUSS/DSCN MKR DOCD: CPT | Performed by: FAMILY MEDICINE

## 2023-01-12 PROCEDURE — 3074F SYST BP LT 130 MM HG: CPT | Performed by: FAMILY MEDICINE

## 2023-01-12 PROCEDURE — 99214 OFFICE O/P EST MOD 30 MIN: CPT | Performed by: FAMILY MEDICINE

## 2023-01-12 PROCEDURE — 3078F DIAST BP <80 MM HG: CPT | Performed by: FAMILY MEDICINE

## 2023-01-12 ASSESSMENT — PATIENT HEALTH QUESTIONNAIRE - PHQ9
SUM OF ALL RESPONSES TO PHQ QUESTIONS 1-9: 0
SUM OF ALL RESPONSES TO PHQ QUESTIONS 1-9: 0
1. LITTLE INTEREST OR PLEASURE IN DOING THINGS: 0
SUM OF ALL RESPONSES TO PHQ9 QUESTIONS 1 & 2: 0
2. FEELING DOWN, DEPRESSED OR HOPELESS: 0
SUM OF ALL RESPONSES TO PHQ QUESTIONS 1-9: 0
SUM OF ALL RESPONSES TO PHQ QUESTIONS 1-9: 0

## 2023-01-12 NOTE — PROGRESS NOTES
Chief Complaint   Patient presents with    Diabetes     9 day follow up on Dexcom       HPI: Raf Ulrich  presents for evaluation and management of diabetes, hypertension and cellulitis. Raf Ulrich notes that his leg is feeling much better. States the redness is gone down significantly and he completed his course of antibiotics. He notes his blood sugars are typically running between 150 and 175 through the day. Reports his morning blood sugars are right around 100. His lowest morning blood sugar has been in the 70s. He adjusted his alarm so it would not go off. He notes no diaphoresis anxiety tremulousness or confusion.       He is taking and tolerating his blood pressure medicines and notes no lightheadedness or dizziness    Today's PHQ:    PHQ Scores 1/12/2023 8/1/2022 10/25/2021 9/16/2021   PHQ2 Score 0 0 0 0   PHQ9 Score 0 0 0 0     Interpretation of Total Score Depression Severity: 1-4 = Minimal depression, 5-9 = Mild depression, 10-14 = Moderate depression, 15-19 = Moderately severe depression, 20-27 = Severe depression        Review of Systems    Allergies   Allergen Reactions    Metformin And Related Rash     New Prescriptions    No medications on file     Current Outpatient Medications   Medication Sig Dispense Refill    Continuous Blood Gluc Sensor (DEXCOM G6 SENSOR) MISC Inject 1 applicator into the skin every 10 days 3 each 3    Continuous Blood Gluc Transmit (DEXCOM G6 TRANSMITTER) MISC 1 applicator by Does not apply route every 3 months 1 each 1    sulfamethoxazole-trimethoprim (BACTRIM DS) 800-160 MG per tablet Take 1 tablet by mouth 2 times daily for 10 days 20 tablet 0    cephALEXin (KEFLEX) 500 MG capsule Take 1 capsule by mouth 4 times daily for 10 days 40 capsule 0    insulin glargine (LANTUS SOLOSTAR) 100 UNIT/ML injection pen Inject 10 Units into the skin nightly 5 Adjustable Dose Pre-filled Pen Syringe 1    lisinopril (PRINIVIL;ZESTRIL) 20 MG tablet Take 1 tablet by mouth daily 90 tablet 1 empagliflozin (JARDIANCE) 25 MG tablet TAKE ONE TABLET BY MOUTH DAILY 30 tablet 11    hydroCHLOROthiazide (MICROZIDE) 12.5 MG capsule TAKE 1 CAPSULE EVERY       MORNING 90 capsule 3    tamsulosin (FLOMAX) 0.4 MG capsule Take 1 capsule by mouth every evening 90 capsule 3    pioglitazone (ACTOS) 30 MG tablet Take 1 tablet by mouth daily 30 tablet 3    tiZANidine (ZANAFLEX) 2 MG tablet Take 1 tablet by mouth nightly as needed (muscle spasms) 30 tablet 3    Dulaglutide (TRULICITY) 1.5 GH/9.1WL SOPN Inject 1.5 mg into the skin every 7 days 4 pen 11    metoprolol succinate (TOPROL XL) 200 MG extended release tablet Take 1 tablet by mouth daily 90 tablet 3    quinapril (ACCUPRIL) 40 MG tablet Take 1 tablet by mouth nightly 90 tablet 3    amLODIPine (NORVASC) 5 MG tablet Take 1 tablet by mouth daily 90 tablet 3    SILDENAFIL CITRATE PO Take 100 mg by mouth daily as needed      silodosin (RAPAFLO) 8 MG CAPS       atorvastatin (LIPITOR) 20 MG tablet Take 1 tablet by mouth nightly 90 tablet 3     No current facility-administered medications for this visit. Past Medical History:   Diagnosis Date    Diabetes mellitus ([facility] [address]     Diverticulosis 06/01/2005    Colonoscopy    Hypertension     Sleep apnea 01/21/2013    Severe CPAP at 11 cwp: non-compliant but informed    Type 2 diabetes mellitus without complication ([facility]) 9502         Objective   BP 98/62   Pulse 71   Temp 97.1 °F (36.2 °C) (Temporal)   Ht 5' 10\" (1.778 m)   Wt 213 lb 12.8 oz (97 kg)   BMI 30.68 kg/m²   Wt Readings from Last 3 Encounters:   01/12/23 213 lb 12.8 oz (97 kg)   01/03/23 210 lb 12.8 oz (95.6 kg)   12/20/22 216 lb (98 kg)       Physical Exam  Constitutional:       Appearance: He is well-developed. Cardiovascular:      Rate and Rhythm: Normal rate and regular rhythm. Pulses:           Dorsalis pedis pulses are 2+ on the right side and 2+ on the left side. Posterior tibial pulses are 2+ on the right side and 2+ on the left side. Heart sounds: No murmur heard. No friction rub. No gallop. Comments: No Lower Extremity Edema  Pulmonary:      Effort: Pulmonary effort is normal.      Breath sounds: Normal breath sounds. No wheezing or rales. Abdominal:      General: Bowel sounds are normal. There is no distension. Palpations: Abdomen is soft. There is no mass. Tenderness: There is no abdominal tenderness. Skin:     General: Skin is warm and dry. Findings: No rash. Chemistry        Component Value Date/Time     08/01/2022 0941    K 3.9 08/01/2022 0941     08/01/2022 0941    CO2 26 08/01/2022 0941    BUN 13 08/01/2022 0941    CREATININE 0.8 08/01/2022 0941        Component Value Date/Time    CALCIUM 9.2 08/01/2022 0941    ALKPHOS 97 08/01/2022 0941    AST 23 08/01/2022 0941    ALT 27 08/01/2022 0941    BILITOT 1.1 (H) 08/01/2022 0941          Lab Results   Component Value Date    WBC 11.9 (H) 08/10/2020    HGB 16.0 08/10/2020    HCT 48.0 08/10/2020    MCV 88.4 08/10/2020     08/10/2020     Lab Results   Component Value Date    LABA1C 9.7 11/07/2022     Lab Results   Component Value Date    .1 05/15/2014     Lab Results   Component Value Date    LABA1C 9.7 11/07/2022     No components found for: CHLPL  Lab Results   Component Value Date    TRIG 109 08/01/2022    TRIG 137 09/16/2021    TRIG 127 10/14/2013     Lab Results   Component Value Date    HDL 35 (L) 08/01/2022    HDL 43 09/16/2021    HDL 35 (L) 10/14/2013     Lab Results   Component Value Date    LDLCALC 51 08/01/2022    LDLCALC 83 09/16/2021    LDLCALC 85 10/14/2013     Lab Results   Component Value Date    LABVLDL 22 08/01/2022    LABVLDL 27 09/16/2021    LABVLDL 25 10/14/2013         Assessment   Plan   1. Type 2 diabetes mellitus without complication, with long-term current use of insulin (HCC)  Improved control. We will teach him how to use his Dexcom and switch out the sets today.   We will stop his glipizide and have him continue to monitor his blood sugars. We are switching him over to long-acting insulin basal therapy at this time. Follow-up in 1 month    2. Cellulitis of right lower extremity  Improved: Counseled patient to continue to monitor the rash off antibiotics follow-up in 1 month    3. Essential hypertension, benign  Blood pressure is low today. We will stop his hydrochlorothiazide as it can adversely affect his blood sugar and give him orthostasis. Follow-up in 1  Month      RTC No follow-ups on file.

## 2023-02-13 ENCOUNTER — OFFICE VISIT (OUTPATIENT)
Dept: PRIMARY CARE CLINIC | Age: 73
End: 2023-02-13
Payer: COMMERCIAL

## 2023-02-13 VITALS
DIASTOLIC BLOOD PRESSURE: 82 MMHG | WEIGHT: 216 LBS | BODY MASS INDEX: 30.99 KG/M2 | SYSTOLIC BLOOD PRESSURE: 133 MMHG | HEART RATE: 68 BPM

## 2023-02-13 DIAGNOSIS — E11.9 TYPE 2 DIABETES MELLITUS WITHOUT COMPLICATION, WITH LONG-TERM CURRENT USE OF INSULIN (HCC): Primary | ICD-10-CM

## 2023-02-13 DIAGNOSIS — G47.33 OBSTRUCTIVE SLEEP APNEA SYNDROME: ICD-10-CM

## 2023-02-13 DIAGNOSIS — E66.09 CLASS 1 OBESITY DUE TO EXCESS CALORIES WITH SERIOUS COMORBIDITY AND BODY MASS INDEX (BMI) OF 32.0 TO 32.9 IN ADULT: ICD-10-CM

## 2023-02-13 DIAGNOSIS — I10 ESSENTIAL HYPERTENSION, BENIGN: ICD-10-CM

## 2023-02-13 DIAGNOSIS — Z79.4 TYPE 2 DIABETES MELLITUS WITHOUT COMPLICATION, WITH LONG-TERM CURRENT USE OF INSULIN (HCC): Primary | ICD-10-CM

## 2023-02-13 PROCEDURE — 3075F SYST BP GE 130 - 139MM HG: CPT | Performed by: FAMILY MEDICINE

## 2023-02-13 PROCEDURE — 3051F HG A1C>EQUAL 7.0%<8.0%: CPT | Performed by: FAMILY MEDICINE

## 2023-02-13 PROCEDURE — 1123F ACP DISCUSS/DSCN MKR DOCD: CPT | Performed by: FAMILY MEDICINE

## 2023-02-13 PROCEDURE — 99214 OFFICE O/P EST MOD 30 MIN: CPT | Performed by: FAMILY MEDICINE

## 2023-02-13 PROCEDURE — 3079F DIAST BP 80-89 MM HG: CPT | Performed by: FAMILY MEDICINE

## 2023-02-13 RX ORDER — PEN NEEDLE, DIABETIC 31 GX5/16"
1 NEEDLE, DISPOSABLE MISCELLANEOUS DAILY
Qty: 100 EACH | Refills: 3 | Status: SHIPPED | OUTPATIENT
Start: 2023-02-13

## 2023-02-13 NOTE — PROGRESS NOTES
Chief Complaint   Patient presents with    Diabetes    Hypertension       HPI: Bennie Alcocer  presents for evaluation and management of diabetes hypertension obesity and sleep apnea. What he notes that when he first started on insulin he had a low blood sugar in the 60s. His continuous glucose monitor was alarming and it was bothering him so he turned it off. Since I last saw him a couple weeks ago his blood sugars have been running from 93 to just under 220. He has not had any alarms. He states most of his blood sugars are running around 1 50-1 60. He has been using 10 units of Lantus and has stopped his glipizide. He notes he really likes the continuous glucose monitor because it is helping him make better choices with food. He stopped taking his hydrochlorothiazide as well. He states his blood pressures have been running in a controlled manner with systolic blood pressures at 055 and diastolics in the 41V. He notes no lightheadedness and dizziness     He continues with stable weight at 216 pounds. This is what he weighed in December.   He refuses to use CPAP because he thinks it is a scam.  He states he understands the previous conversations I have had with him about the pathophysiology of sleep apnea and why it is important to treat it         Review of Systems    Allergies   Allergen Reactions    Metformin And Related Rash     New Prescriptions    INSULIN PEN NEEDLE (B-D ULTRAFINE III SHORT PEN) 31G X 8 MM MISC    1 each by Does not apply route daily     Current Outpatient Medications   Medication Sig Dispense Refill    Insulin Pen Needle (B-D ULTRAFINE III SHORT PEN) 31G X 8 MM MISC 1 each by Does not apply route daily 100 each 3    Continuous Blood Gluc Sensor (DEXCOM G6 SENSOR) MISC Inject 1 applicator into the skin every 10 days 3 each 3    Continuous Blood Gluc Transmit (DEXCOM G6 TRANSMITTER) MISC 1 applicator by Does not apply route every 3 months 1 each 1    insulin glargine (LANTUS SOLOSTAR) 100 UNIT/ML injection pen Inject 10 Units into the skin nightly 5 Adjustable Dose Pre-filled Pen Syringe 1    lisinopril (PRINIVIL;ZESTRIL) 20 MG tablet Take 1 tablet by mouth daily 90 tablet 1    empagliflozin (JARDIANCE) 25 MG tablet TAKE ONE TABLET BY MOUTH DAILY 30 tablet 11    tamsulosin (FLOMAX) 0.4 MG capsule Take 1 capsule by mouth every evening 90 capsule 3    tiZANidine (ZANAFLEX) 2 MG tablet Take 1 tablet by mouth nightly as needed (muscle spasms) 30 tablet 3    Dulaglutide (TRULICITY) 1.5 XN/4.6MO SOPN Inject 1.5 mg into the skin every 7 days 4 pen 11    metoprolol succinate (TOPROL XL) 200 MG extended release tablet Take 1 tablet by mouth daily 90 tablet 3    quinapril (ACCUPRIL) 40 MG tablet Take 1 tablet by mouth nightly 90 tablet 3    amLODIPine (NORVASC) 5 MG tablet Take 1 tablet by mouth daily 90 tablet 3    atorvastatin (LIPITOR) 20 MG tablet Take 1 tablet by mouth nightly 90 tablet 3    SILDENAFIL CITRATE PO Take 100 mg by mouth daily as needed      silodosin (RAPAFLO) 8 MG CAPS        No current facility-administered medications for this visit. Past Medical History:   Diagnosis Date    Diabetes mellitus ([facility] [address]     Diverticulosis 06/01/2005    Colonoscopy    Hypertension     Sleep apnea 01/21/2013    Severe CPAP at 11 cwp: non-compliant but informed    Type 2 diabetes mellitus without complication (HCC) 4000         Objective   /82   Pulse 68   Wt 216 lb (98 kg)   BMI 30.99 kg/m²   Wt Readings from Last 3 Encounters:   02/13/23 216 lb (98 kg)   01/12/23 213 lb 12.8 oz (97 kg)   01/03/23 210 lb 12.8 oz (95.6 kg)       Physical Exam  Constitutional:       Appearance: He is well-developed. Cardiovascular:      Rate and Rhythm: Normal rate and regular rhythm. Heart sounds: No murmur heard. No friction rub. No gallop. Pulmonary:      Effort: Pulmonary effort is normal.      Breath sounds: Normal breath sounds. No wheezing or rales.    Abdominal:      General: Bowel sounds are normal. There is no distension. Palpations: Abdomen is soft. There is no mass. Tenderness: There is no abdominal tenderness. Musculoskeletal:      Right lower leg: No edema. Left lower leg: No edema. Skin:     General: Skin is warm and dry. Findings: No rash. Comments: Leg wound has healed up in entirety there is no leg erythema         Chemistry        Component Value Date/Time     08/01/2022 0941    K 3.9 08/01/2022 0941     08/01/2022 0941    CO2 26 08/01/2022 0941    BUN 13 08/01/2022 0941    CREATININE 0.8 08/01/2022 0941        Component Value Date/Time    CALCIUM 9.2 08/01/2022 0941    ALKPHOS 97 08/01/2022 0941    AST 23 08/01/2022 0941    ALT 27 08/01/2022 0941    BILITOT 1.1 (H) 08/01/2022 0941          Lab Results   Component Value Date    WBC 11.9 (H) 08/10/2020    HGB 16.0 08/10/2020    HCT 48.0 08/10/2020    MCV 88.4 08/10/2020     08/10/2020     Lab Results   Component Value Date    LABA1C 7.6 02/13/2023     Lab Results   Component Value Date    .1 05/15/2014     Lab Results   Component Value Date    LABA1C 7.6 02/13/2023     No components found for: CHLPL  Lab Results   Component Value Date    TRIG 109 08/01/2022    TRIG 137 09/16/2021    TRIG 127 10/14/2013     Lab Results   Component Value Date    HDL 35 (L) 08/01/2022    HDL 43 09/16/2021    HDL 35 (L) 10/14/2013     Lab Results   Component Value Date    LDLCALC 51 08/01/2022    LDLCALC 83 09/16/2021    LDLCALC 85 10/14/2013     Lab Results   Component Value Date    LABVLDL 22 08/01/2022    LABVLDL 27 09/16/2021    LABVLDL 25 10/14/2013         Assessment   Plan   1. Type 2 diabetes mellitus without complication, with long-term current use of insulin ([facility])  Hemoglobin A1c was 7.6 today. This is a dramatic improvement from November. Praised patient efforts at managing his diabetes. We will DC his Actos and follow-up in 3 months. May need to titrate up his insulin at that time.   We are stopping the Actos because of risks of drug interactions and adverse reactions from the medicine.  - Insulin Pen Needle (B-D ULTRAFINE III SHORT PEN) 31G X 8 MM MISC; 1 each by Does not apply route daily  Dispense: 100 each; Refill: 3    2. Essential hypertension, benign  Well-controlled with cessation of hydrochlorothiazide. We will continue to monitor on current meds and follow-up in 3 months    3. Obstructive sleep apnea syndrome  Encouraged patient to work on weight loss to reduce the risks of sleep apnea. He is willing to do so    4. Class 1 obesity due to excess calories with serious comorbidity and body mass index (BMI) of 32.0 to 32.9 in adult  As above      RTC Return in about 3 months (around 5/13/2023).

## 2023-04-17 ENCOUNTER — OFFICE VISIT (OUTPATIENT)
Dept: PRIMARY CARE CLINIC | Age: 73
End: 2023-04-17
Payer: COMMERCIAL

## 2023-04-17 VITALS
SYSTOLIC BLOOD PRESSURE: 131 MMHG | BODY MASS INDEX: 30.78 KG/M2 | HEART RATE: 78 BPM | WEIGHT: 215 LBS | HEIGHT: 70 IN | TEMPERATURE: 97.2 F | DIASTOLIC BLOOD PRESSURE: 75 MMHG

## 2023-04-17 DIAGNOSIS — E11.9 TYPE 2 DIABETES MELLITUS WITHOUT COMPLICATION, WITHOUT LONG-TERM CURRENT USE OF INSULIN (HCC): ICD-10-CM

## 2023-04-17 DIAGNOSIS — M25.519 NECK AND SHOULDER PAIN: Primary | ICD-10-CM

## 2023-04-17 DIAGNOSIS — I10 ESSENTIAL HYPERTENSION, BENIGN: ICD-10-CM

## 2023-04-17 DIAGNOSIS — M54.2 NECK AND SHOULDER PAIN: Primary | ICD-10-CM

## 2023-04-17 PROCEDURE — 3075F SYST BP GE 130 - 139MM HG: CPT | Performed by: FAMILY MEDICINE

## 2023-04-17 PROCEDURE — 99214 OFFICE O/P EST MOD 30 MIN: CPT | Performed by: FAMILY MEDICINE

## 2023-04-17 PROCEDURE — 1123F ACP DISCUSS/DSCN MKR DOCD: CPT | Performed by: FAMILY MEDICINE

## 2023-04-17 PROCEDURE — 3051F HG A1C>EQUAL 7.0%<8.0%: CPT | Performed by: FAMILY MEDICINE

## 2023-04-17 PROCEDURE — 3078F DIAST BP <80 MM HG: CPT | Performed by: FAMILY MEDICINE

## 2023-04-17 RX ORDER — CYCLOBENZAPRINE HCL 10 MG
10 TABLET ORAL 3 TIMES DAILY PRN
Qty: 20 TABLET | Refills: 0 | Status: SHIPPED | OUTPATIENT
Start: 2023-04-17 | End: 2023-04-27

## 2023-04-17 RX ORDER — METHYLPREDNISOLONE 4 MG/1
TABLET ORAL
Qty: 1 KIT | Refills: 0 | Status: SHIPPED | OUTPATIENT
Start: 2023-04-17

## 2023-04-17 SDOH — ECONOMIC STABILITY: FOOD INSECURITY: WITHIN THE PAST 12 MONTHS, YOU WORRIED THAT YOUR FOOD WOULD RUN OUT BEFORE YOU GOT MONEY TO BUY MORE.: NEVER TRUE

## 2023-04-17 SDOH — ECONOMIC STABILITY: FOOD INSECURITY: WITHIN THE PAST 12 MONTHS, THE FOOD YOU BOUGHT JUST DIDN'T LAST AND YOU DIDN'T HAVE MONEY TO GET MORE.: NEVER TRUE

## 2023-04-17 SDOH — ECONOMIC STABILITY: INCOME INSECURITY: HOW HARD IS IT FOR YOU TO PAY FOR THE VERY BASICS LIKE FOOD, HOUSING, MEDICAL CARE, AND HEATING?: NOT HARD AT ALL

## 2023-04-17 SDOH — ECONOMIC STABILITY: HOUSING INSECURITY
IN THE LAST 12 MONTHS, WAS THERE A TIME WHEN YOU DID NOT HAVE A STEADY PLACE TO SLEEP OR SLEPT IN A SHELTER (INCLUDING NOW)?: NO

## 2023-04-17 NOTE — PATIENT INSTRUCTIONS
Freeze several neo cups full of water    About an hour before bedtime take your flexeril,     Then use the ice 20 minutes directly on your back where your hurt. It will feel cold, then burn, then go numb and finally get doughy. Then go to sleep.

## 2023-04-17 NOTE — PROGRESS NOTES
bedtime then Ice your lower back for 20 min  Dispense: 20 tablet; Refill: 0  - methylPREDNISolone (MEDROL DOSEPACK) 4 MG tablet; Take by mouth. Dispense: 1 kit; Refill: 0    2. Type 2 diabetes mellitus without complication, without long-term current use of insulin (HCC)  Appears stable: Counseled patient to continue to check his blood sugars and we will follow-up in 1 month    3. Essential hypertension, benign  Controlled: Appears stable. We will continue current management and monitor for adverse reaction and disease progression. Follow-up as noted below          RTC Return in about 1 month (around 5/17/2023).

## 2023-04-25 DIAGNOSIS — I10 HYPERTENSION: ICD-10-CM

## 2023-04-25 RX ORDER — AMLODIPINE BESYLATE 5 MG/1
TABLET ORAL
Qty: 90 TABLET | Refills: 3 | Status: SHIPPED | OUTPATIENT
Start: 2023-04-25

## 2023-04-25 NOTE — TELEPHONE ENCOUNTER
Medication:   Requested Prescriptions     Pending Prescriptions Disp Refills    amLODIPine (NORVASC) 5 MG tablet [Pharmacy Med Name: AMLODIPINE TAB 5MG] 90 tablet 3     Sig: TAKE 1 TABLET DAILY        Last Filled:  Last refill: 1/24/2023    Patient Phone Number: [phone] (home)     Last appt: 4/17/2023   Next appt: 5/15/2023    Last OARRS: No flowsheet data found.

## 2023-04-28 DIAGNOSIS — E11.9 TYPE 2 DIABETES MELLITUS WITHOUT COMPLICATION, WITH LONG-TERM CURRENT USE OF INSULIN (HCC): ICD-10-CM

## 2023-04-28 DIAGNOSIS — Z79.4 TYPE 2 DIABETES MELLITUS WITHOUT COMPLICATION, WITH LONG-TERM CURRENT USE OF INSULIN (HCC): ICD-10-CM

## 2023-04-28 RX ORDER — PROCHLORPERAZINE 25 MG/1
SUPPOSITORY RECTAL
Qty: 3 EACH | Refills: 3 | Status: SHIPPED | OUTPATIENT
Start: 2023-04-28

## 2023-04-28 NOTE — TELEPHONE ENCOUNTER
Medication:   Requested Prescriptions     Pending Prescriptions Disp Refills    Continuous Blood Gluc Sensor (DEXCOM G6 SENSOR) MISC [Pharmacy Med Name: Eric Posada SENSOR] 3 each 3     Sig: INJECT  INTO THE SKIN EVERY 10 DAYS        Last Filled:  01/04/23    Patient Phone Number: [phone] (home)     Last appt: 4/17/2023   Next appt: 5/15/2023    Last OARRS: No flowsheet data found.

## 2023-05-12 DIAGNOSIS — M25.519 NECK AND SHOULDER PAIN: ICD-10-CM

## 2023-05-12 DIAGNOSIS — M54.2 NECK AND SHOULDER PAIN: ICD-10-CM

## 2023-05-15 RX ORDER — METHYLPREDNISOLONE 4 MG/1
TABLET ORAL
Qty: 21 TABLET | Refills: 0 | Status: SHIPPED | OUTPATIENT
Start: 2023-05-15

## 2023-05-15 NOTE — TELEPHONE ENCOUNTER
Medication:   Requested Prescriptions     Pending Prescriptions Disp Refills    methylPREDNISolone (MEDROL DOSEPACK) 4 MG tablet [Pharmacy Med Name: methylPREDNISolone 4 MG DOSEPK] 21 tablet      Sig: TAKE BY MOUTH AS INSTRUCTED - PER PACKAGE INSTRUCTIONS        Last Filled:  Last refill: 4/17/2023    Patient Phone Number: [phone] (home)     Last appt: 4/17/2023   Next appt: 5/22/2023    Last OARRS: No flowsheet data found.

## 2023-05-22 ENCOUNTER — OFFICE VISIT (OUTPATIENT)
Dept: PRIMARY CARE CLINIC | Age: 73
End: 2023-05-22
Payer: COMMERCIAL

## 2023-05-22 VITALS
SYSTOLIC BLOOD PRESSURE: 118 MMHG | HEIGHT: 70 IN | HEART RATE: 75 BPM | BODY MASS INDEX: 30.78 KG/M2 | OXYGEN SATURATION: 98 % | DIASTOLIC BLOOD PRESSURE: 81 MMHG | WEIGHT: 215 LBS | TEMPERATURE: 97.3 F

## 2023-05-22 DIAGNOSIS — I10 PRIMARY HYPERTENSION: ICD-10-CM

## 2023-05-22 DIAGNOSIS — E11.9 TYPE 2 DIABETES MELLITUS WITHOUT COMPLICATION, WITH LONG-TERM CURRENT USE OF INSULIN (HCC): Primary | ICD-10-CM

## 2023-05-22 DIAGNOSIS — Z79.4 TYPE 2 DIABETES MELLITUS WITHOUT COMPLICATION, WITH LONG-TERM CURRENT USE OF INSULIN (HCC): ICD-10-CM

## 2023-05-22 DIAGNOSIS — E11.9 TYPE 2 DIABETES MELLITUS WITHOUT COMPLICATION, WITH LONG-TERM CURRENT USE OF INSULIN (HCC): ICD-10-CM

## 2023-05-22 DIAGNOSIS — M25.519 NECK AND SHOULDER PAIN: ICD-10-CM

## 2023-05-22 DIAGNOSIS — M54.2 NECK AND SHOULDER PAIN: ICD-10-CM

## 2023-05-22 DIAGNOSIS — Z79.4 TYPE 2 DIABETES MELLITUS WITHOUT COMPLICATION, WITH LONG-TERM CURRENT USE OF INSULIN (HCC): Primary | ICD-10-CM

## 2023-05-22 LAB — HBA1C MFR BLD: 7.3 %

## 2023-05-22 PROCEDURE — 99214 OFFICE O/P EST MOD 30 MIN: CPT | Performed by: FAMILY MEDICINE

## 2023-05-22 PROCEDURE — 1123F ACP DISCUSS/DSCN MKR DOCD: CPT | Performed by: FAMILY MEDICINE

## 2023-05-22 PROCEDURE — 83036 HEMOGLOBIN GLYCOSYLATED A1C: CPT | Performed by: FAMILY MEDICINE

## 2023-05-22 PROCEDURE — 3079F DIAST BP 80-89 MM HG: CPT | Performed by: FAMILY MEDICINE

## 2023-05-22 PROCEDURE — 3074F SYST BP LT 130 MM HG: CPT | Performed by: FAMILY MEDICINE

## 2023-05-22 PROCEDURE — 3051F HG A1C>EQUAL 7.0%<8.0%: CPT | Performed by: FAMILY MEDICINE

## 2023-05-22 NOTE — PROGRESS NOTES
CALCIUM 9.2 08/01/2022 0941    ALKPHOS 97 08/01/2022 0941    AST 23 08/01/2022 0941    ALT 27 08/01/2022 0941    BILITOT 1.1 (H) 08/01/2022 0941          Lab Results   Component Value Date    WBC 11.9 (H) 08/10/2020    HGB 16.0 08/10/2020    HCT 48.0 08/10/2020    MCV 88.4 08/10/2020     08/10/2020     Lab Results   Component Value Date    LABA1C 7.3 05/22/2023     Lab Results   Component Value Date    .1 05/15/2014     Lab Results   Component Value Date    LABA1C 7.3 05/22/2023     No components found for: CHLPL  Lab Results   Component Value Date    TRIG 109 08/01/2022    TRIG 137 09/16/2021    TRIG 127 10/14/2013     Lab Results   Component Value Date    HDL 35 (L) 08/01/2022    HDL 43 09/16/2021    HDL 35 (L) 10/14/2013     Lab Results   Component Value Date    LDLCALC 51 08/01/2022    LDLCALC 83 09/16/2021    LDLCALC 85 10/14/2013     Lab Results   Component Value Date    LABVLDL 22 08/01/2022    LABVLDL 27 09/16/2021    LABVLDL 25 10/14/2013         Assessment   Plan   1. Type 2 diabetes mellitus without complication, with long-term current use of insulin (HCC)  Hemoglobin A1c was 7.2 today. This correlates to an average glucose of about 150. Counseled patient that he is doing well and that I am more concerned about hypoglycemic episodes at this time. Continue current meds and follow-up in 3 months  - Comprehensive Metabolic Panel; Future  - POCT glycosylated hemoglobin (Hb A1C)  - Microalbumin / Creatinine Urine Ratio; Future    2. Neck and shoulder pain  Suspect due to trapezius strain. Counseled ice, muscle relaxants and encouraged him to do his home exercise program    3. Primary hypertension  Controlled: Appears stable. We will continue current management and monitor for adverse reaction and disease progression. Follow-up as noted below          RTC Return in about 3 months (around 8/22/2023).

## 2023-05-23 LAB
ALBUMIN SERPL-MCNC: 4.5 G/DL (ref 3.4–5)
ALBUMIN/GLOB SERPL: 1.7 {RATIO} (ref 1.1–2.2)
ALP SERPL-CCNC: 91 U/L (ref 40–129)
ALT SERPL-CCNC: 18 U/L (ref 10–40)
ANION GAP SERPL CALCULATED.3IONS-SCNC: 11 MMOL/L (ref 3–16)
AST SERPL-CCNC: 15 U/L (ref 15–37)
BILIRUB SERPL-MCNC: 0.3 MG/DL (ref 0–1)
BUN SERPL-MCNC: 15 MG/DL (ref 7–20)
CALCIUM SERPL-MCNC: 9.1 MG/DL (ref 8.3–10.6)
CHLORIDE SERPL-SCNC: 103 MMOL/L (ref 99–110)
CO2 SERPL-SCNC: 25 MMOL/L (ref 21–32)
CREAT SERPL-MCNC: 0.8 MG/DL (ref 0.8–1.3)
CREAT UR-MCNC: 46.7 MG/DL (ref 39–259)
GFR SERPLBLD CREATININE-BSD FMLA CKD-EPI: >60 ML/MIN/{1.73_M2}
GLUCOSE SERPL-MCNC: 184 MG/DL (ref 70–99)
MICROALBUMIN UR DL<=1MG/L-MCNC: <1.2 MG/DL
MICROALBUMIN/CREAT UR: NORMAL MG/G (ref 0–30)
POTASSIUM SERPL-SCNC: 4.3 MMOL/L (ref 3.5–5.1)
PROT SERPL-MCNC: 7.2 G/DL (ref 6.4–8.2)
SODIUM SERPL-SCNC: 139 MMOL/L (ref 136–145)

## 2023-08-28 ENCOUNTER — OFFICE VISIT (OUTPATIENT)
Dept: PRIMARY CARE CLINIC | Age: 73
End: 2023-08-28
Payer: COMMERCIAL

## 2023-08-28 VITALS
WEIGHT: 219.6 LBS | HEART RATE: 85 BPM | DIASTOLIC BLOOD PRESSURE: 82 MMHG | HEIGHT: 70 IN | SYSTOLIC BLOOD PRESSURE: 136 MMHG | TEMPERATURE: 97.3 F | BODY MASS INDEX: 31.44 KG/M2

## 2023-08-28 DIAGNOSIS — Z11.59 ENCOUNTER FOR HEPATITIS C SCREENING TEST FOR LOW RISK PATIENT: ICD-10-CM

## 2023-08-28 DIAGNOSIS — N40.0 BENIGN PROSTATIC HYPERPLASIA, UNSPECIFIED WHETHER LOWER URINARY TRACT SYMPTOMS PRESENT: ICD-10-CM

## 2023-08-28 DIAGNOSIS — E11.9 TYPE 2 DIABETES MELLITUS WITHOUT COMPLICATION, WITH LONG-TERM CURRENT USE OF INSULIN (HCC): ICD-10-CM

## 2023-08-28 DIAGNOSIS — E11.9 TYPE 2 DIABETES MELLITUS WITHOUT COMPLICATION, WITH LONG-TERM CURRENT USE OF INSULIN (HCC): Primary | ICD-10-CM

## 2023-08-28 DIAGNOSIS — S16.1XXA STRAIN OF NECK MUSCLE, INITIAL ENCOUNTER: ICD-10-CM

## 2023-08-28 DIAGNOSIS — Z79.4 TYPE 2 DIABETES MELLITUS WITHOUT COMPLICATION, WITH LONG-TERM CURRENT USE OF INSULIN (HCC): Primary | ICD-10-CM

## 2023-08-28 DIAGNOSIS — E66.09 OBESITY DUE TO EXCESS CALORIES WITH SERIOUS COMORBIDITY, UNSPECIFIED CLASSIFICATION: ICD-10-CM

## 2023-08-28 DIAGNOSIS — I10 ESSENTIAL HYPERTENSION, BENIGN: ICD-10-CM

## 2023-08-28 DIAGNOSIS — Z79.4 TYPE 2 DIABETES MELLITUS WITHOUT COMPLICATION, WITH LONG-TERM CURRENT USE OF INSULIN (HCC): ICD-10-CM

## 2023-08-28 LAB — HBA1C MFR BLD: 10.9 %

## 2023-08-28 PROCEDURE — 3075F SYST BP GE 130 - 139MM HG: CPT | Performed by: STUDENT IN AN ORGANIZED HEALTH CARE EDUCATION/TRAINING PROGRAM

## 2023-08-28 PROCEDURE — 99214 OFFICE O/P EST MOD 30 MIN: CPT | Performed by: STUDENT IN AN ORGANIZED HEALTH CARE EDUCATION/TRAINING PROGRAM

## 2023-08-28 PROCEDURE — 3079F DIAST BP 80-89 MM HG: CPT | Performed by: STUDENT IN AN ORGANIZED HEALTH CARE EDUCATION/TRAINING PROGRAM

## 2023-08-28 PROCEDURE — 1123F ACP DISCUSS/DSCN MKR DOCD: CPT | Performed by: STUDENT IN AN ORGANIZED HEALTH CARE EDUCATION/TRAINING PROGRAM

## 2023-08-28 PROCEDURE — 83036 HEMOGLOBIN GLYCOSYLATED A1C: CPT | Performed by: STUDENT IN AN ORGANIZED HEALTH CARE EDUCATION/TRAINING PROGRAM

## 2023-08-28 PROCEDURE — 3046F HEMOGLOBIN A1C LEVEL >9.0%: CPT | Performed by: STUDENT IN AN ORGANIZED HEALTH CARE EDUCATION/TRAINING PROGRAM

## 2023-08-28 RX ORDER — DULAGLUTIDE 3 MG/.5ML
3 INJECTION, SOLUTION SUBCUTANEOUS WEEKLY
Qty: 4 ADJUSTABLE DOSE PRE-FILLED PEN SYRINGE | Refills: 2 | Status: SHIPPED | OUTPATIENT
Start: 2023-08-28

## 2023-08-28 RX ORDER — DULAGLUTIDE 3 MG/.5ML
3 INJECTION, SOLUTION SUBCUTANEOUS WEEKLY
Qty: 4 ADJUSTABLE DOSE PRE-FILLED PEN SYRINGE | Refills: 2 | Status: SHIPPED | OUTPATIENT
Start: 2023-08-28 | End: 2023-08-28

## 2023-08-28 RX ORDER — LISINOPRIL 40 MG/1
40 TABLET ORAL DAILY
Qty: 30 TABLET | Refills: 2 | Status: SHIPPED | OUTPATIENT
Start: 2023-08-28 | End: 2023-08-29 | Stop reason: SDUPTHER

## 2023-08-28 RX ORDER — TIZANIDINE 2 MG/1
2 TABLET ORAL NIGHTLY PRN
Qty: 30 TABLET | Refills: 3 | Status: SHIPPED | OUTPATIENT
Start: 2023-08-28

## 2023-08-28 RX ORDER — PROCHLORPERAZINE 25 MG/1
SUPPOSITORY RECTAL
COMMUNITY
Start: 2023-07-10 | End: 2023-08-28

## 2023-08-28 ASSESSMENT — ENCOUNTER SYMPTOMS
NAUSEA: 0
SHORTNESS OF BREATH: 0
WHEEZING: 0

## 2023-08-28 NOTE — PROGRESS NOTES
[facility]  Establish care visit   2023    Frances Vick (:  1950) is a 81 y.o. male, here to establish care. Chief Complaint   Patient presents with    New Patient    Diabetes    Medication Refill        ASSESSMENT/ PLAN  1. Type 2 diabetes mellitus without complication, with long-term current use of insulin (HCC)  Uncontrolled, A1c 10.9. Continue Lantus 10 units nightly at this time, but hopefully we can wean off of insulin. Increase Trulicity to 3 mg weekly. Continue Jardiance. Check home blood sugar with glucometer; allergic reaction to CGM adhesive. - POCT glycosylated hemoglobin (Hb A1C)  - Lipid Panel; Future  - Comprehensive Metabolic Panel; Future  - Dulaglutide (TRULICITY) 3 XP/4.9RH SOPN; Inject 3 mg into the skin once a week  Dispense: 4 Adjustable Dose Pre-filled Pen Syringe; Refill: 2    2. Essential hypertension, benign  Controlled, will discontinue amlodipine and increase lisinopril to consolidate medication regimen. - lisinopril (PRINIVIL;ZESTRIL) 40 MG tablet; Take 1 tablet by mouth daily  Dispense: 30 tablet; Refill: 2    3. Encounter for hepatitis C screening test for low risk patient  - Hepatitis C Antibody; Future    4. Strain of neck muscle, initial encounter  Uncontrolled, continue muscle relaxer and referral placed for physical therapy  - Ambulatory referral to Physical Therapy  - tiZANidine (ZANAFLEX) 2 MG tablet; Take 1 tablet by mouth nightly as needed (muscle spasms)  Dispense: 30 tablet; Refill: 3    5. Obesity due to excess calories with serious comorbidity, unspecified classification  Complicates all aspects of patient's care    6. Benign prostatic hyperplasia, unspecified whether lower urinary tract symptoms present  Controlled, continue Flomax       Return in about 4 weeks (around 2023) for diabetes follow up. HPI  Patient presents today to establish care.     For his diabetes, he has been taking Jardiance, 10 units of Lantus, 1.5 mg of

## 2023-08-29 DIAGNOSIS — I10 ESSENTIAL HYPERTENSION, BENIGN: ICD-10-CM

## 2023-08-29 LAB
ALBUMIN SERPL-MCNC: 4.8 G/DL (ref 3.4–5)
ALBUMIN/GLOB SERPL: 1.8 {RATIO} (ref 1.1–2.2)
ALP SERPL-CCNC: 90 U/L (ref 40–129)
ALT SERPL-CCNC: 25 U/L (ref 10–40)
ANION GAP SERPL CALCULATED.3IONS-SCNC: 10 MMOL/L (ref 3–16)
AST SERPL-CCNC: 20 U/L (ref 15–37)
BILIRUB SERPL-MCNC: 0.9 MG/DL (ref 0–1)
BUN SERPL-MCNC: 15 MG/DL (ref 7–20)
CALCIUM SERPL-MCNC: 9.6 MG/DL (ref 8.3–10.6)
CHLORIDE SERPL-SCNC: 100 MMOL/L (ref 99–110)
CHOLEST SERPL-MCNC: 126 MG/DL (ref 0–199)
CO2 SERPL-SCNC: 28 MMOL/L (ref 21–32)
CREAT SERPL-MCNC: 0.9 MG/DL (ref 0.8–1.3)
GFR SERPLBLD CREATININE-BSD FMLA CKD-EPI: >60 ML/MIN/{1.73_M2}
GLUCOSE SERPL-MCNC: 172 MG/DL (ref 70–99)
HCV AB SERPL QL IA: NORMAL
HDLC SERPL-MCNC: 43 MG/DL (ref 40–60)
LDLC SERPL CALC-MCNC: 55 MG/DL
POTASSIUM SERPL-SCNC: 5.1 MMOL/L (ref 3.5–5.1)
PROT SERPL-MCNC: 7.4 G/DL (ref 6.4–8.2)
SODIUM SERPL-SCNC: 138 MMOL/L (ref 136–145)
TRIGL SERPL-MCNC: 138 MG/DL (ref 0–150)
VLDLC SERPL CALC-MCNC: 28 MG/DL

## 2023-08-29 RX ORDER — LISINOPRIL 40 MG/1
40 TABLET ORAL DAILY
Qty: 90 TABLET | Refills: 1 | Status: SHIPPED | OUTPATIENT
Start: 2023-08-29

## 2023-08-29 NOTE — TELEPHONE ENCOUNTER
Medication:   Requested Prescriptions     Pending Prescriptions Disp Refills    lisinopril (PRINIVIL;ZESTRIL) 40 MG tablet 30 tablet 2     Sig: Take 1 tablet by mouth daily        Last Filled:  8/28/2023     Patient Phone Number: [phone] (home)     Last appt: 8/28/2023   Next appt: 10/2/2023    Requested 90 day supply

## 2023-09-05 ENCOUNTER — HOSPITAL ENCOUNTER (OUTPATIENT)
Dept: PHYSICAL THERAPY | Age: 73
Setting detail: THERAPIES SERIES
Discharge: HOME | End: 2023-09-05
Payer: MEDICARE

## 2023-09-05 PROCEDURE — 97161 PT EVAL LOW COMPLEX 20 MIN: CPT | Performed by: PHYSICAL THERAPIST

## 2023-09-05 PROCEDURE — 97110 THERAPEUTIC EXERCISES: CPT | Performed by: PHYSICAL THERAPIST

## 2023-09-05 NOTE — FLOWSHEET NOTE
driving/computer work  [] (28808) Reviewed/Progressed HEP activities related to improving balance, coordination, kinesthetic sense, posture, motor skill, proprioception of cervical, scapular, scapulothoracic and UE control with self care, reaching, carrying, lifting, house/yardwork, driving/computer work      Manual Treatments:  PROM / STM / Oscillations-Mobs:  G-I, II, III, IV (PA's, Inf., Post.)  [] ([id number]) Provided manual therapy to mobilize soft tissue/joints of cervical/CT, scapular GHJ and UE for the purpose of decreasing headache, modulating pain, promoting relaxation,  increasing ROM, reducing/eliminating soft tissue swelling/inflammation/restriction, improving soft tissue extensibility and allowing for proper ROM for normal function with self care, reaching, carrying, lifting, house/yardwork, driving/computer work    Modalities:      Charges  Timed Code Treatment Minutes: ***   Total Treatment Minutes: ***     Medicare total (add KX at $2000)  ***       [facility] time in/ time out:    TE time in /out    Manual time in/out    Neuro re ed time in/out    Untimed minutes      [] EVAL - LOW (19172)   [] EVAL - MOD (12143)  [] EVAL - HIGH (24608)  [] RE-EVAL (47734)    [] VA(32206) x       [] Ionto  [] NMR (08590) x       [] Vaso  [] Manual ([id number]) x       [] Ultrasound  [] TA x        [] Mech Traction (20592)  [] ES (un) (45744):     [] ES(attended) (35626)   [] Dry Needling 1-2 muscles (61077):  [] Dry Needling 3+ muscles (648322  [] Other:     GOALS:*** (cut and paste from eval)    Overall Progression Towards Functional goals/ Treatment Progress Update:  [] Patient is progressing as expected towards functional goals listed. [] Progression is slowed due to complexities/Impairments listed. [] Progression has been slowed due to co-morbidities.   [x] Plan just implemented, too soon to assess goals progression <30days   [] Goals require adjustment due to lack of progress  [] Patient is not progressing as expected and

## 2023-09-11 ENCOUNTER — HOSPITAL ENCOUNTER (OUTPATIENT)
Dept: PHYSICAL THERAPY | Age: 73
Setting detail: THERAPIES SERIES
Discharge: HOME | End: 2023-09-11
Payer: MEDICARE

## 2023-09-11 NOTE — FLOWSHEET NOTE
[place] [address] [address], [address] [facility], [address]  Phone: [phone]  Fax [phone]    Physical Therapy  Cancellation/No-show Note  Patient Name:  Dar Damon  :  1950   Date:  2023  Cancelled visits to date: 0  No-shows to date: 0    Patient status for today's appointment patient:  [x]  Cancelled  []  Rescheduled appointment  []  No-show     Reason given by patient:  []  Patient ill  []  Conflicting appointment  []  No transportation    []  Conflict with work  []  No reason given  []  Other:     Comments:      Electronically signed by:  Jessica Jade PT

## 2023-09-18 ENCOUNTER — HOSPITAL ENCOUNTER (OUTPATIENT)
Dept: PHYSICAL THERAPY | Age: 73
Setting detail: THERAPIES SERIES
Discharge: HOME | End: 2023-09-18
Payer: MEDICARE

## 2023-09-18 NOTE — FLOWSHEET NOTE
[place] [address] [address], [address]  Phone: [phone]  Fax [phone]    Physical Therapy  Cancellation/No-show Note  Patient Name:  Kaci Caraballo  :  1950   Date:  2023  Cancelled visits to date: 1  No-shows to date: 0    Patient status for today's appointment patient:  [x]  Cancelled  []  Rescheduled appointment  []  No-show     Reason given by patient:  [x]  Patient ill  []  Conflicting appointment  []  No transportation    []  Conflict with work  []  No reason given  []  Other:     Comments:      Electronically signed by:  Nikole Jefferson PT

## 2023-09-26 LAB — PSA, TOTAL: 1.73 NG/ML (ref 0–4)

## 2023-10-03 ENCOUNTER — TELEPHONE (OUTPATIENT)
Dept: PRIMARY CARE CLINIC | Age: 73
End: 2023-10-03

## 2023-10-03 RX ORDER — ACYCLOVIR 400 MG/1
1 TABLET ORAL
Qty: 3 EACH | Refills: 3 | Status: SHIPPED | OUTPATIENT
Start: 2023-10-03 | End: 2023-11-02

## 2023-10-03 NOTE — TELEPHONE ENCOUNTER
Pt needs dexcom transmitter sent to     [facility] [id number] [facility] [place] - [address] [place] [address] [phone] - F [phone]

## 2023-10-04 ENCOUNTER — TELEPHONE (OUTPATIENT)
Dept: PRIMARY CARE CLINIC | Age: 73
End: 2023-10-04

## 2023-10-04 DIAGNOSIS — Z79.4 TYPE 2 DIABETES MELLITUS WITHOUT COMPLICATION, WITH LONG-TERM CURRENT USE OF INSULIN (HCC): Primary | ICD-10-CM

## 2023-10-04 DIAGNOSIS — E11.9 TYPE 2 DIABETES MELLITUS WITHOUT COMPLICATION, WITH LONG-TERM CURRENT USE OF INSULIN (HCC): Primary | ICD-10-CM

## 2023-10-04 RX ORDER — ACYCLOVIR 400 MG/1
1 TABLET ORAL 4 TIMES DAILY
Qty: 1 EACH | Refills: 0 | Status: SHIPPED | OUTPATIENT
Start: 2023-10-04

## 2023-10-06 ENCOUNTER — NURSE ONLY (OUTPATIENT)
Dept: PRIMARY CARE CLINIC | Age: 73
End: 2023-10-06
Payer: COMMERCIAL

## 2023-10-06 DIAGNOSIS — Z23 NEED FOR IMMUNIZATION AGAINST INFLUENZA: Primary | ICD-10-CM

## 2023-10-06 PROCEDURE — 90694 VACC AIIV4 NO PRSRV 0.5ML IM: CPT | Performed by: STUDENT IN AN ORGANIZED HEALTH CARE EDUCATION/TRAINING PROGRAM

## 2023-10-06 PROCEDURE — 90471 IMMUNIZATION ADMIN: CPT | Performed by: STUDENT IN AN ORGANIZED HEALTH CARE EDUCATION/TRAINING PROGRAM

## 2023-10-16 RX ORDER — TAMSULOSIN HYDROCHLORIDE 0.4 MG/1
0.4 CAPSULE ORAL EVERY EVENING
Qty: 90 CAPSULE | Refills: 3 | Status: SHIPPED | OUTPATIENT
Start: 2023-10-16

## 2023-10-16 NOTE — TELEPHONE ENCOUNTER
Medication:   Requested Prescriptions     Pending Prescriptions Disp Refills    tamsulosin (FLOMAX) 0.4 MG capsule 90 capsule 3     Sig: Take 1 capsule by mouth every evening        Last Filled:  9/13/2022    Patient Phone Number: [phone] (home)     Last appt: 8/28/2023   Next appt: 10/23/2023        Return in about 4 weeks (around 9/25/2023) for diabetes follow up.

## 2023-10-23 ENCOUNTER — OFFICE VISIT (OUTPATIENT)
Dept: PRIMARY CARE CLINIC | Age: 73
End: 2023-10-23
Payer: COMMERCIAL

## 2023-10-23 VITALS
HEART RATE: 95 BPM | DIASTOLIC BLOOD PRESSURE: 78 MMHG | SYSTOLIC BLOOD PRESSURE: 132 MMHG | BODY MASS INDEX: 31.26 KG/M2 | TEMPERATURE: 98.2 F | HEIGHT: 70 IN | WEIGHT: 218.4 LBS

## 2023-10-23 DIAGNOSIS — I10 ESSENTIAL HYPERTENSION, BENIGN: Primary | ICD-10-CM

## 2023-10-23 DIAGNOSIS — Z79.4 TYPE 2 DIABETES MELLITUS WITHOUT COMPLICATION, WITH LONG-TERM CURRENT USE OF INSULIN (HCC): ICD-10-CM

## 2023-10-23 DIAGNOSIS — E11.9 TYPE 2 DIABETES MELLITUS WITHOUT COMPLICATION, WITH LONG-TERM CURRENT USE OF INSULIN (HCC): ICD-10-CM

## 2023-10-23 LAB — HBA1C MFR BLD: 8.8 %

## 2023-10-23 PROCEDURE — 3046F HEMOGLOBIN A1C LEVEL >9.0%: CPT | Performed by: STUDENT IN AN ORGANIZED HEALTH CARE EDUCATION/TRAINING PROGRAM

## 2023-10-23 PROCEDURE — 83036 HEMOGLOBIN GLYCOSYLATED A1C: CPT | Performed by: STUDENT IN AN ORGANIZED HEALTH CARE EDUCATION/TRAINING PROGRAM

## 2023-10-23 PROCEDURE — 3078F DIAST BP <80 MM HG: CPT | Performed by: STUDENT IN AN ORGANIZED HEALTH CARE EDUCATION/TRAINING PROGRAM

## 2023-10-23 PROCEDURE — 3075F SYST BP GE 130 - 139MM HG: CPT | Performed by: STUDENT IN AN ORGANIZED HEALTH CARE EDUCATION/TRAINING PROGRAM

## 2023-10-23 PROCEDURE — 1123F ACP DISCUSS/DSCN MKR DOCD: CPT | Performed by: STUDENT IN AN ORGANIZED HEALTH CARE EDUCATION/TRAINING PROGRAM

## 2023-10-23 PROCEDURE — 99214 OFFICE O/P EST MOD 30 MIN: CPT | Performed by: STUDENT IN AN ORGANIZED HEALTH CARE EDUCATION/TRAINING PROGRAM

## 2023-10-23 ASSESSMENT — ENCOUNTER SYMPTOMS
NAUSEA: 0
SHORTNESS OF BREATH: 0
WHEEZING: 0

## 2023-10-23 NOTE — PROGRESS NOTES
[facility]  10/23/2023    David Carlton (:  1950) is a 81 y.o. male, here for evaluation of the following medical concerns:    Chief Complaint   Patient presents with    Diabetes        ASSESSMENT/ PLAN  1. Essential hypertension, benign  Controlled, continue lisinopril    2. Type 2 diabetes mellitus without complication, with long-term current use of insulin (HCC)  Uncontrolled, A1c 8.8. Restart Trulicity at 1.5 mg. Continue Lantus, Jardiance. Would like to discontinue Lantus at follow-up appointment pending A1c less than 8 at that time. - dulaglutide (TRULICITY) 1.5 XK/1.7CH SC injection; Inject 0.5 mLs into the skin once a week  Dispense: 4 Adjustable Dose Pre-filled Pen Syringe; Refill: 2       Return in about 3 months (around 2024) for diabetes follow up, blood pressure follow-up. HPI  Patient presents today for follow-up on hypertension, diabetes. He was unable to tolerate Trulicity 3 mg, so since previous appointment has been taking Lantus 10 mg nightly and Jardiance. He is willing to restart Trulicity 1.5 mg. He remains physically active with working on his house. He is currently taking losartan 40 mg daily as prescribed for his hypertension. Checks his blood pressure at home and is typically 122/80    ROS  Review of Systems   Constitutional:  Negative for activity change and unexpected weight change. HENT:  Negative for tinnitus. Eyes:  Negative for visual disturbance. Respiratory:  Negative for shortness of breath and wheezing. Cardiovascular:  Negative for chest pain, palpitations and leg swelling. Gastrointestinal:  Negative for nausea. Genitourinary:  Negative for decreased urine volume. Neurological:  Negative for syncope, light-headedness and headaches.        HISTORIES  Current Outpatient Medications on File Prior to Visit   Medication Sig Dispense Refill    tamsulosin (FLOMAX) 0.4 MG capsule Take 1 capsule by mouth every evening 90 capsule

## 2023-11-20 DIAGNOSIS — E11.9 TYPE 2 DIABETES MELLITUS WITHOUT COMPLICATION, WITHOUT LONG-TERM CURRENT USE OF INSULIN (HCC): ICD-10-CM

## 2023-11-20 RX ORDER — INSULIN GLARGINE 100 [IU]/ML
10 INJECTION, SOLUTION SUBCUTANEOUS NIGHTLY
Qty: 5 ADJUSTABLE DOSE PRE-FILLED PEN SYRINGE | Refills: 1 | Status: SHIPPED | OUTPATIENT
Start: 2023-11-20

## 2023-11-20 NOTE — TELEPHONE ENCOUNTER
Medication:   Requested Prescriptions     Pending Prescriptions Disp Refills    insulin glargine (LANTUS SOLOSTAR) 100 UNIT/ML injection pen 5 Adjustable Dose Pre-filled Pen Syringe 1     Sig: Inject 10 Units into the skin nightly        Last Filled:  1/3/23    Last appt: 10/23/2023   Next appt: 1/22/2024    Last OARRS:        No data to display

## 2024-01-16 DIAGNOSIS — E11.9 TYPE 2 DIABETES MELLITUS WITHOUT COMPLICATION, WITHOUT LONG-TERM CURRENT USE OF INSULIN (HCC): ICD-10-CM

## 2024-01-16 NOTE — TELEPHONE ENCOUNTER
Medication:   Requested Prescriptions     Pending Prescriptions Disp Refills    empagliflozin (JARDIANCE) 25 MG tablet 30 tablet 11     Sig: TAKE ONE TABLET BY MOUTH DAILY        Last Filled:  12/21/2022    Patient Phone Number: [phone] (home)     Last appt: 10/23/2023   Next appt: 1/22/2024    Last OARRS:        No data to display

## 2024-01-22 ENCOUNTER — OFFICE VISIT (OUTPATIENT)
Dept: PRIMARY CARE CLINIC | Age: 74
End: 2024-01-22
Payer: COMMERCIAL

## 2024-01-22 VITALS
TEMPERATURE: 98.2 F | HEART RATE: 52 BPM | BODY MASS INDEX: 31.55 KG/M2 | HEIGHT: 70 IN | SYSTOLIC BLOOD PRESSURE: 142 MMHG | WEIGHT: 220.4 LBS | DIASTOLIC BLOOD PRESSURE: 98 MMHG

## 2024-01-22 DIAGNOSIS — E11.9 TYPE 2 DIABETES MELLITUS WITHOUT COMPLICATION, WITH LONG-TERM CURRENT USE OF INSULIN (HCC): ICD-10-CM

## 2024-01-22 DIAGNOSIS — Z79.4 TYPE 2 DIABETES MELLITUS WITHOUT COMPLICATION, WITH LONG-TERM CURRENT USE OF INSULIN (HCC): ICD-10-CM

## 2024-01-22 DIAGNOSIS — E11.9 TYPE 2 DIABETES MELLITUS WITHOUT COMPLICATION, WITH LONG-TERM CURRENT USE OF INSULIN (HCC): Primary | ICD-10-CM

## 2024-01-22 DIAGNOSIS — I10 ESSENTIAL HYPERTENSION, BENIGN: ICD-10-CM

## 2024-01-22 DIAGNOSIS — Z79.4 TYPE 2 DIABETES MELLITUS WITHOUT COMPLICATION, WITH LONG-TERM CURRENT USE OF INSULIN (HCC): Primary | ICD-10-CM

## 2024-01-22 LAB
ALBUMIN SERPL-MCNC: 4.9 G/DL (ref 3.4–5)
ALBUMIN/GLOB SERPL: 1.6 {RATIO} (ref 1.1–2.2)
ALP SERPL-CCNC: 92 U/L (ref 40–129)
ALT SERPL-CCNC: 28 U/L (ref 10–40)
ANION GAP SERPL CALCULATED.3IONS-SCNC: 13 MMOL/L (ref 3–16)
AST SERPL-CCNC: 22 U/L (ref 15–37)
BILIRUB SERPL-MCNC: 0.9 MG/DL (ref 0–1)
BUN SERPL-MCNC: 17 MG/DL (ref 7–20)
CALCIUM SERPL-MCNC: 9.2 MG/DL (ref 8.3–10.6)
CHLORIDE SERPL-SCNC: 101 MMOL/L (ref 99–110)
CHOLEST SERPL-MCNC: 107 MG/DL (ref 0–199)
CO2 SERPL-SCNC: 28 MMOL/L (ref 21–32)
CREAT SERPL-MCNC: 0.8 MG/DL (ref 0.8–1.3)
GFR SERPLBLD CREATININE-BSD FMLA CKD-EPI: >60 ML/MIN/{1.73_M2}
GLUCOSE SERPL-MCNC: 131 MG/DL (ref 70–99)
HBA1C MFR BLD: 7.7 %
HDLC SERPL-MCNC: 45 MG/DL (ref 40–60)
LDLC SERPL CALC-MCNC: 48 MG/DL
POTASSIUM SERPL-SCNC: 4.1 MMOL/L (ref 3.5–5.1)
PROT SERPL-MCNC: 8 G/DL (ref 6.4–8.2)
SODIUM SERPL-SCNC: 142 MMOL/L (ref 136–145)
TRIGL SERPL-MCNC: 72 MG/DL (ref 0–150)
VLDLC SERPL CALC-MCNC: 14 MG/DL

## 2024-01-22 PROCEDURE — 3051F HG A1C>EQUAL 7.0%<8.0%: CPT | Performed by: STUDENT IN AN ORGANIZED HEALTH CARE EDUCATION/TRAINING PROGRAM

## 2024-01-22 PROCEDURE — 3077F SYST BP >= 140 MM HG: CPT | Performed by: STUDENT IN AN ORGANIZED HEALTH CARE EDUCATION/TRAINING PROGRAM

## 2024-01-22 PROCEDURE — 1123F ACP DISCUSS/DSCN MKR DOCD: CPT | Performed by: STUDENT IN AN ORGANIZED HEALTH CARE EDUCATION/TRAINING PROGRAM

## 2024-01-22 PROCEDURE — 3080F DIAST BP >= 90 MM HG: CPT | Performed by: STUDENT IN AN ORGANIZED HEALTH CARE EDUCATION/TRAINING PROGRAM

## 2024-01-22 PROCEDURE — 83036 HEMOGLOBIN GLYCOSYLATED A1C: CPT | Performed by: STUDENT IN AN ORGANIZED HEALTH CARE EDUCATION/TRAINING PROGRAM

## 2024-01-22 PROCEDURE — 99214 OFFICE O/P EST MOD 30 MIN: CPT | Performed by: STUDENT IN AN ORGANIZED HEALTH CARE EDUCATION/TRAINING PROGRAM

## 2024-01-22 RX ORDER — DULAGLUTIDE 3 MG/.5ML
3 INJECTION, SOLUTION SUBCUTANEOUS WEEKLY
Qty: 6 ML | Refills: 1
Start: 2024-01-22

## 2024-01-22 RX ORDER — DULAGLUTIDE 1.5 MG/.5ML
INJECTION, SOLUTION SUBCUTANEOUS
Qty: 2 ML | OUTPATIENT
Start: 2024-01-22

## 2024-01-22 ASSESSMENT — ENCOUNTER SYMPTOMS
SHORTNESS OF BREATH: 0
NAUSEA: 0
WHEEZING: 0

## 2024-01-22 ASSESSMENT — PATIENT HEALTH QUESTIONNAIRE - PHQ9
SUM OF ALL RESPONSES TO PHQ QUESTIONS 1-9: 0
SUM OF ALL RESPONSES TO PHQ9 QUESTIONS 1 & 2: 0
SUM OF ALL RESPONSES TO PHQ QUESTIONS 1-9: 0
2. FEELING DOWN, DEPRESSED OR HOPELESS: 0
1. LITTLE INTEREST OR PLEASURE IN DOING THINGS: 0

## 2024-01-22 NOTE — TELEPHONE ENCOUNTER
Medication:   Requested Prescriptions     Pending Prescriptions Disp Refills    TRULICITY 1.5 MG/0.5ML SC injection [Pharmacy Med Name: TRULICITY 1.5 MG/0.5 ML PEN] 2 mL      Sig: INJECT 1.5 MG UNDER THE SKIN ONCE WEEKLY        Last Filled:      Patient Phone Number: [phone] (home)     Last appt: 10/23/2023   Next appt: 1/22/2024    Last OARRS:        No data to display

## 2024-01-22 NOTE — PATIENT INSTRUCTIONS
I recommend wearing a thumb spica splint brace for your hand pain     Send me your MyChart blood pressure readings in 1 week

## 2024-01-22 NOTE — PROGRESS NOTES
[facility]  2024    Chandler Hilton (:  1950) is a 73 y.o. male, here for evaluation of the following medical concerns:    Chief Complaint   Patient presents with    Diabetes    Hypertension        ASSESSMENT/ PLAN  1. Type 2 diabetes mellitus without complication, with long-term current use of insulin (HCC)  Controlled, increase Trulicity to 3 mg weekly.  Continue Lantus, Jardiance.  Would ideally like to wean off of insulin.  Continue statin and ACE.  - POCT glycosylated hemoglobin (Hb A1C)  - Comprehensive Metabolic Panel; Future  - Lipid Panel; Future  - Dulaglutide (TRULICITY) 3 MG/0.5ML SOPN; Inject 3 mg into the skin once a week  Dispense: 6 mL; Refill: 1    2. Essential hypertension, benign  Uncontrolled today, however previously well-controlled on current medication regimen.  Continue lisinopril, send MyChart update with home blood pressure readings.  Will plan on increasing if persistently uncontrolled.       Return in about 3 months (around 2024) for blood pressure follow-up, diabetes follow up.    HPI  Patient presents today for follow-up on hypertension, diabetes.    He is currently taking lisinopril daily as prescribed for his hypertension.  It was previously well-controlled, and he attributes the elevation today to traffic prior to the appointment.  He does not routinely check his blood pressure at home.    He is currently taking Jardiance, 1.5 mg Trulicity and 10 units of Lantus nightly for his insulin.  Endorses adherence to diet and exercise regimen.  Would like to continue increasing his Trulicity for optimize control.  Denies side effects of medication    ROS  Review of Systems   Constitutional:  Negative for activity change and unexpected weight change.   HENT:  Negative for tinnitus.    Eyes:  Negative for visual disturbance.   Respiratory:  Negative for shortness of breath and wheezing.    Cardiovascular:  Negative for chest pain, palpitations and leg swelling.

## 2024-01-23 DIAGNOSIS — E11.9 TYPE 2 DIABETES MELLITUS WITHOUT COMPLICATION, WITH LONG-TERM CURRENT USE OF INSULIN (HCC): ICD-10-CM

## 2024-01-23 DIAGNOSIS — Z79.4 TYPE 2 DIABETES MELLITUS WITHOUT COMPLICATION, WITH LONG-TERM CURRENT USE OF INSULIN (HCC): ICD-10-CM

## 2024-01-23 RX ORDER — DULAGLUTIDE 1.5 MG/.5ML
INJECTION, SOLUTION SUBCUTANEOUS
Qty: 2 ML | OUTPATIENT
Start: 2024-01-23

## 2024-02-20 DIAGNOSIS — E11.9 TYPE 2 DIABETES MELLITUS WITHOUT COMPLICATION, WITH LONG-TERM CURRENT USE OF INSULIN (HCC): Primary | ICD-10-CM

## 2024-02-20 DIAGNOSIS — Z79.4 TYPE 2 DIABETES MELLITUS WITHOUT COMPLICATION, WITH LONG-TERM CURRENT USE OF INSULIN (HCC): Primary | ICD-10-CM

## 2024-02-20 RX ORDER — ACYCLOVIR 400 MG/1
TABLET ORAL
Qty: 3 EACH | Refills: 5 | Status: SHIPPED | OUTPATIENT
Start: 2024-02-20

## 2024-02-20 NOTE — TELEPHONE ENCOUNTER
Medication:   Requested Prescriptions     Pending Prescriptions Disp Refills    Continuous Blood Gluc Sensor (DEXCOM G7 SENSOR) MISC [Pharmacy Med Name: DEXCOM G7 SENSOR] 3 each 3     Sig: USE TO MONITOR BLOOD GLUCOSE CONTINUOUSLY, CHANGE EVERY 10 DAYS        Last Filled:  10/03/23    Patient Phone Number: [phone] (home)     Last appt: 1/22/2024   Next appt: 4/1/2024    Last OARRS:        No data to display

## 2024-03-03 ENCOUNTER — APPOINTMENT (OUTPATIENT)
Dept: GENERAL RADIOLOGY | Age: 74
End: 2024-03-03
Payer: COMMERCIAL

## 2024-03-03 ENCOUNTER — HOSPITAL ENCOUNTER (EMERGENCY)
Age: 74
Discharge: HOME | End: 2024-03-03
Payer: COMMERCIAL

## 2024-03-03 VITALS
BODY MASS INDEX: 30.06 KG/M2 | OXYGEN SATURATION: 94 % | TEMPERATURE: 97.9 F | HEIGHT: 70 IN | SYSTOLIC BLOOD PRESSURE: 150 MMHG | DIASTOLIC BLOOD PRESSURE: 95 MMHG | HEART RATE: 100 BPM | RESPIRATION RATE: 16 BRPM | WEIGHT: 210 LBS

## 2024-03-03 DIAGNOSIS — S60.453A FOREIGN BODY OF LEFT MIDDLE FINGER: Primary | ICD-10-CM

## 2024-03-03 PROCEDURE — 73140 X-RAY EXAM OF FINGER(S): CPT

## 2024-03-03 PROCEDURE — 99283 EMERGENCY DEPT VISIT LOW MDM: CPT

## 2024-03-03 ASSESSMENT — PAIN DESCRIPTION - LOCATION: LOCATION: HAND

## 2024-03-03 ASSESSMENT — PAIN DESCRIPTION - PAIN TYPE: TYPE: ACUTE PAIN

## 2024-03-03 ASSESSMENT — PAIN DESCRIPTION - ORIENTATION: ORIENTATION: LEFT

## 2024-03-03 ASSESSMENT — PAIN - FUNCTIONAL ASSESSMENT: PAIN_FUNCTIONAL_ASSESSMENT: 0-10

## 2024-03-03 ASSESSMENT — PAIN SCALES - GENERAL: PAINLEVEL_OUTOF10: 1

## 2024-03-03 NOTE — ED PROVIDER NOTES
sepsis, or toxicity. Chandler Hilton and I have discussed the diagnosis and risks, and we agree with discharging home to follow-up with their primary doctor. We also discussed returning to the Emergency Department immediately if new or worsening symptoms occur. We have discussed the symptoms which are most concerning (e.g., changing or worsening pain, fever, numbness, weakness, cool or painful digits) that necessitate immediate return.       I am the Primary Clinician of Record.  FINAL IMPRESSION      1. Foreign body of left middle finger          DISPOSITION/PLAN     DISPOSITION Decision To Discharge 03/03/2024 11:57:25 AM      PATIENT REFERRED TO:  Klaudia Balderas DO  [address] [id number] [phone]    Call   As needed    Noel Ko MD  [address] [facility] [address] [phone]    Call in 1 day  For further evaluation-orthopedist    [facility]  ED  [address]  [facility] [address] [phone]  Go to   If symptoms worsen      DISCHARGE MEDICATIONS:  New Prescriptions    No medications on file       DISCONTINUED MEDICATIONS:  Discontinued Medications    No medications on file              (Please note that portions of this note were completed with a voice recognition program.  Efforts were made to edit the dictations but occasionally words are mis-transcribed.)    SHYANN Armijo CNP (electronically signed)       Arely Mancera APRN - CNP  03/03/24 [id number]

## 2024-03-04 RX ORDER — CEPHALEXIN 500 MG/1
500 CAPSULE ORAL 4 TIMES DAILY
Qty: 40 CAPSULE | Refills: 0 | Status: SHIPPED | OUTPATIENT
Start: 2024-03-04 | End: 2024-03-14

## 2024-03-21 DIAGNOSIS — E11.9 TYPE 2 DIABETES MELLITUS WITHOUT COMPLICATION, WITH LONG-TERM CURRENT USE OF INSULIN (HCC): ICD-10-CM

## 2024-03-21 DIAGNOSIS — Z79.4 TYPE 2 DIABETES MELLITUS WITHOUT COMPLICATION, WITH LONG-TERM CURRENT USE OF INSULIN (HCC): ICD-10-CM

## 2024-03-22 NOTE — TELEPHONE ENCOUNTER
Medication:   Requested Prescriptions     Pending Prescriptions Disp Refills    TRULICITY 1.5 MG/0.5ML SC injection [Pharmacy Med Name: TRULICITY 1.5 MG/0.5 ML PEN] 2 mL      Sig: INJECT 1.5 MG UNDER THE SKIN ONCE WEEKLY        Last Filled: 1/22/24    Patient Phone Number: [phone] (home)     Last appt: 1/22/2024   Next appt: 4/1/2024    Last OARRS:        No data to display

## 2024-03-24 RX ORDER — DULAGLUTIDE 1.5 MG/.5ML
INJECTION, SOLUTION SUBCUTANEOUS
Qty: 2 ML | OUTPATIENT
Start: 2024-03-24

## 2024-04-01 ENCOUNTER — OFFICE VISIT (OUTPATIENT)
Dept: PRIMARY CARE CLINIC | Age: 74
End: 2024-04-01
Payer: COMMERCIAL

## 2024-04-01 VITALS
WEIGHT: 221.8 LBS | HEIGHT: 70 IN | HEART RATE: 85 BPM | TEMPERATURE: 97.8 F | BODY MASS INDEX: 31.75 KG/M2 | SYSTOLIC BLOOD PRESSURE: 135 MMHG | DIASTOLIC BLOOD PRESSURE: 86 MMHG

## 2024-04-01 DIAGNOSIS — I10 ESSENTIAL HYPERTENSION, BENIGN: ICD-10-CM

## 2024-04-01 DIAGNOSIS — Z79.4 TYPE 2 DIABETES MELLITUS WITHOUT COMPLICATION, WITH LONG-TERM CURRENT USE OF INSULIN (HCC): Primary | ICD-10-CM

## 2024-04-01 DIAGNOSIS — Z12.11 ENCOUNTER FOR SCREENING FOR MALIGNANT NEOPLASM OF COLON: ICD-10-CM

## 2024-04-01 DIAGNOSIS — E11.9 TYPE 2 DIABETES MELLITUS WITHOUT COMPLICATION, WITH LONG-TERM CURRENT USE OF INSULIN (HCC): Primary | ICD-10-CM

## 2024-04-01 PROBLEM — E11.69 TYPE 2 DIABETES MELLITUS WITH OBESITY (HCC): Status: ACTIVE | Noted: 2024-04-01

## 2024-04-01 PROBLEM — E66.9 TYPE 2 DIABETES MELLITUS WITH OBESITY (HCC): Status: ACTIVE | Noted: 2024-04-01

## 2024-04-01 LAB — HBA1C MFR BLD: 7.9 %

## 2024-04-01 PROCEDURE — 3051F HG A1C>EQUAL 7.0%<8.0%: CPT | Performed by: STUDENT IN AN ORGANIZED HEALTH CARE EDUCATION/TRAINING PROGRAM

## 2024-04-01 PROCEDURE — 1123F ACP DISCUSS/DSCN MKR DOCD: CPT | Performed by: STUDENT IN AN ORGANIZED HEALTH CARE EDUCATION/TRAINING PROGRAM

## 2024-04-01 PROCEDURE — 99214 OFFICE O/P EST MOD 30 MIN: CPT | Performed by: STUDENT IN AN ORGANIZED HEALTH CARE EDUCATION/TRAINING PROGRAM

## 2024-04-01 PROCEDURE — 3075F SYST BP GE 130 - 139MM HG: CPT | Performed by: STUDENT IN AN ORGANIZED HEALTH CARE EDUCATION/TRAINING PROGRAM

## 2024-04-01 PROCEDURE — 83036 HEMOGLOBIN GLYCOSYLATED A1C: CPT | Performed by: STUDENT IN AN ORGANIZED HEALTH CARE EDUCATION/TRAINING PROGRAM

## 2024-04-01 PROCEDURE — 3079F DIAST BP 80-89 MM HG: CPT | Performed by: STUDENT IN AN ORGANIZED HEALTH CARE EDUCATION/TRAINING PROGRAM

## 2024-04-01 RX ORDER — DULAGLUTIDE 3 MG/.5ML
3 INJECTION, SOLUTION SUBCUTANEOUS WEEKLY
Qty: 6 ML | Refills: 3 | Status: SHIPPED | OUTPATIENT
Start: 2024-04-01

## 2024-04-01 ASSESSMENT — ENCOUNTER SYMPTOMS
SHORTNESS OF BREATH: 0
WHEEZING: 0
NAUSEA: 0

## 2024-04-01 NOTE — PATIENT INSTRUCTIONS
Pal Romero  [address], [address]  [facility] [address]                 Phone:   [phone]

## 2024-04-01 NOTE — PROGRESS NOTES
reviewed.   Constitutional:       General: He is not in acute distress.     Appearance: Normal appearance.   HENT:      Head: Normocephalic and atraumatic.   Eyes:      Extraocular Movements: Extraocular movements intact.      Pupils: Pupils are equal, round, and reactive to light.   Cardiovascular:      Rate and Rhythm: Normal rate and regular rhythm.      Pulses: Normal pulses.      Heart sounds: Normal heart sounds. No murmur heard.     No gallop.   Pulmonary:      Effort: Pulmonary effort is normal.      Breath sounds: Normal breath sounds. No wheezing or rales.   Abdominal:      General: Abdomen is flat.      Palpations: Abdomen is soft.   Musculoskeletal:      Right lower leg: No edema.      Left lower leg: No edema.   Skin:     General: Skin is warm and dry.   Neurological:      Mental Status: He is alert.         Klaudia Balderas DO    This dictation was generated by voice recognition computer software.  Although all attempts are made to edit the dictation for accuracy, there may be errors in the transcription that are not intended.

## 2024-04-11 DIAGNOSIS — E11.9 TYPE 2 DIABETES MELLITUS WITHOUT COMPLICATION, WITH LONG-TERM CURRENT USE OF INSULIN (HCC): ICD-10-CM

## 2024-04-11 DIAGNOSIS — Z79.4 TYPE 2 DIABETES MELLITUS WITHOUT COMPLICATION, WITH LONG-TERM CURRENT USE OF INSULIN (HCC): ICD-10-CM

## 2024-04-12 RX ORDER — ACYCLOVIR 400 MG/1
TABLET ORAL
Qty: 1 EACH | Refills: 0 | Status: SHIPPED | OUTPATIENT
Start: 2024-04-12

## 2024-04-12 RX ORDER — DULAGLUTIDE 1.5 MG/.5ML
INJECTION, SOLUTION SUBCUTANEOUS
Qty: 2 ML | OUTPATIENT
Start: 2024-04-12

## 2024-04-12 NOTE — TELEPHONE ENCOUNTER
[facility]    [address]    [facility] [address]    Phone:  [phone]           Cayla Britt   MRN: [id number]    Department:  [facility]   Date of Visit:  2/14/2017           Diagnosis     Vaginal bleeding Postpartum       You were seen by Anjum Boyce MD.      Disclaimer     Follow-up Care:  It is your responsibility to arrange for follow-up care with your healthcare provider or as instructed. Call to get an appointment time.           Contact your doctor for follow-up appointment if not already scheduled.     Call Brad Cabrera MD.    Specialty:  Obstetrics & Gynecology    Comments:  To be seen in the office this week.     Contact information    [address]  [address]  [facility] [address]  [phone]        Preventive care and screening     Your blood pressure was (!) 154/105 today. If your blood pressure is higher than 120/80, we recommend follow up with your primary care provider to obtain basic health screening, including reassessment of your blood pressure, within three months.          Medications you received while in the ED through 02/14/2017 11:52 PM     None         What to Do with Your Medications      Notice     No changes were made to your prescriptions during this visit.            Procedures and tests performed during your visit     ABO/RH Confirmation    CBC & Auto Differential    Type/Screen    Urinalysis with Micro & Culture if Indicated    Urine Culture      Procedures     None      Imaging Results     None        Discharge Instructions           Dysfunctional Uterine Bleeding    Dysfunctional uterine bleeding is a condition in which bleeding is abnormal and occurs at unexpected times of the month. This happens due to changes in the hormones that help control a woman’s menstrual cycle each month.  The bleeding may be heavier or lighter than normal. If you have heavy bleeding often, this can lead to a problem called anemia. With anemia, your  Medication:   Requested Prescriptions     Pending Prescriptions Disp Refills    Continuous Blood Gluc  (DEXCOM G7 ) NOEMI [Pharmacy Med Name: DEXCOM G7 ] 1 each 0     Sig: USE TO MONITOR BLOOD GLUCOSE FOUR TIMES A DAY IN THE MORNING AT NOON IN THE EVENING AND AT BEDTIME     Refused Prescriptions Disp Refills    TRULICITY 1.5 MG/0.5ML SC injection [Pharmacy Med Name: TRULICITY 1.5 MG/0.5 ML PEN] 2 mL      Sig: INJECT 1.5 MG UNDER THE SKIN ONCE WEEKLY     Refused By: NATHANIEL TRAN     Reason for Refusal: Medication dose changed        Last Filled:  10/04/23    Patient Phone Number: [phone] (home)     Last appt: 4/1/2024   Next appt: 8/26/2024    Last OARRS:        No data to display                     red blood cell count is too low. Red blood cells are needed because they help carry oxygen throughout your body. Severe anemia may cause you to look pale and feel very weak or tired. You might also become short of breath easily.  To treat dysfunctional uterine bleeding, medicines are often tried first. If these don’t help, further testing and treatments may be needed. Discuss all of your options with your provider.  Home care  Medicines  If you’re prescribed medicines, be sure to take them as directed. Some of the more common medicines you may be prescribed include:  · Hormone therapy (Options include most methods of hormonal birth control such as pills, shots, or a hormone-releasing IUD)  · Nonsteroidal anti-inflammatory drugs (NSAIDs), such as ibuprofen  · Iron supplements, if you have anemia     General care  · Get plenty of rest if you tire easily. Avoid heavy exertion.  · To help relieve pain or cramping that may occur with bleeding, try using a heating pad on the lower belly or back. A warm bath may also help.  Follow-up care  Follow up with your healthcare provider as directed.  When to seek medical advice  Call your healthcare provider right away if:  · Bleeding becomes heavy (soaking 1 pad or tampon every hour for 3 hours)  · Increased abdominal pain  · Irregular bleeding worsens or does not get better even with treatment  · Fever of 100.4ºF (38ºC) or higher, or as directed by your provider  · Signs of anemia, such as pale skin, extreme fatigue or weakness, or shortness of breath  · Dizziness or fainting      © 5764-3253 [employer]. [address] [place] [address], [place] [address]. All rights reserved. This information is not intended as a substitute for professional medical care. Always follow your healthcare professional's instructions.      Follow up with OBGYN on 2/15/17.  Call in the morning for appointment.    Discharge References/Attachments     None      Medication Safety: What you need to  know     Maintain Security - It is important to keep all medications in a secure location:  Keep out of the reach of children and pets    Consider using a lock box or locked filing cabinet    Place pill bottles in private area such as bedroom or drawer    Don't Share - It is illegal to share your prescription medication, even with family:  The doctor prescribes medications specifically for you and your body    You cannot be sure how the drug may affect others physically or emotionally    It is a criminal offense to share prescriptions    Proper Disposal - It is no longer acceptable to flush or throw away medications:  Recent studies show measurable amounts of medication have been found in drinking water and wildlife due to flushing or throwing away medications    Medication strength changes over time and is not typically safe after one year    Proper disposal removes the medication from your home in a safe way so that others don't have access to it. Use your local drug drop site.    Your local pharmacy can provide information on medication disposal options in your community. The Department of [employer] Drug Enforcement Administration website also has information on safe medication disposal:  www.deadiversion.usdoj.gov/drug_disposal/index.html

## 2024-04-12 NOTE — TELEPHONE ENCOUNTER
Medication:   Requested Prescriptions     Pending Prescriptions Disp Refills    TRULICITY 1.5 MG/0.5ML SC injection [Pharmacy Med Name: TRULICITY 1.5 MG/0.5 ML PEN] 2 mL      Sig: INJECT 1.5 MG UNDER THE SKIN ONCE WEEKLY    Continuous Blood Gluc  (DEXCOM G7 ) NOEMI [Pharmacy Med Name: DEXCOM G7 ] 1 each 0     Sig: USE TO MONITOR BLOOD GLUCOSE FOUR TIMES A DAY IN THE MORNING AT NOON IN THE EVENING AND AT BEDTIME        Last Filled:  10/4/23    Last appt: 4/1/2024   Next appt: 8/26/2024    Last OARRS:        No data to display

## 2024-04-15 DIAGNOSIS — Z79.4 TYPE 2 DIABETES MELLITUS WITHOUT COMPLICATION, WITH LONG-TERM CURRENT USE OF INSULIN (HCC): ICD-10-CM

## 2024-04-15 DIAGNOSIS — E11.9 TYPE 2 DIABETES MELLITUS WITHOUT COMPLICATION, WITH LONG-TERM CURRENT USE OF INSULIN (HCC): ICD-10-CM

## 2024-04-27 DIAGNOSIS — E11.9 TYPE 2 DIABETES MELLITUS WITHOUT COMPLICATION, WITH LONG-TERM CURRENT USE OF INSULIN (HCC): ICD-10-CM

## 2024-04-27 DIAGNOSIS — Z79.4 TYPE 2 DIABETES MELLITUS WITHOUT COMPLICATION, WITH LONG-TERM CURRENT USE OF INSULIN (HCC): ICD-10-CM

## 2024-04-29 RX ORDER — DULAGLUTIDE 1.5 MG/.5ML
INJECTION, SOLUTION SUBCUTANEOUS
Qty: 2 ML | OUTPATIENT
Start: 2024-04-29

## 2024-04-29 NOTE — TELEPHONE ENCOUNTER
Medication:   Requested Prescriptions     Pending Prescriptions Disp Refills    TRULICITY 1.5 MG/0.5ML SC injection [Pharmacy Med Name: TRULICITY 1.5 MG/0.5 ML PEN] 2 mL      Sig: INJECT 1.5 MG UNDER THE SKIN ONCE WEEKLY        Last Filled:      Patient Phone Number: [phone] (home)     Last appt: 4/1/2024   Next appt: 8/19/2024    Last OARRS:        No data to display

## 2024-06-19 DIAGNOSIS — E11.9 TYPE 2 DIABETES MELLITUS WITHOUT COMPLICATION, WITHOUT LONG-TERM CURRENT USE OF INSULIN (HCC): ICD-10-CM

## 2024-06-19 NOTE — TELEPHONE ENCOUNTER
Medication:   Requested Prescriptions     Pending Prescriptions Disp Refills    atorvastatin (LIPITOR) 20 MG tablet 90 tablet 3     Sig: Take 1 tablet by mouth nightly        Last Filled:  4/11/23    Patient Phone Number: [phone] (home)     Last appt: 4/1/2024   Next appt: 8/19/2024    Last OARRS:        No data to display

## 2024-06-20 RX ORDER — ATORVASTATIN CALCIUM 20 MG/1
20 TABLET, FILM COATED ORAL NIGHTLY
Qty: 90 TABLET | Refills: 3 | Status: SHIPPED | OUTPATIENT
Start: 2024-06-20

## 2024-07-01 DIAGNOSIS — Z79.4 TYPE 2 DIABETES MELLITUS WITHOUT COMPLICATION, WITH LONG-TERM CURRENT USE OF INSULIN (HCC): ICD-10-CM

## 2024-07-01 DIAGNOSIS — E11.9 TYPE 2 DIABETES MELLITUS WITHOUT COMPLICATION, WITH LONG-TERM CURRENT USE OF INSULIN (HCC): ICD-10-CM

## 2024-07-01 RX ORDER — PEN NEEDLE, DIABETIC 31 GX5/16"
1 NEEDLE, DISPOSABLE MISCELLANEOUS DAILY
Qty: 100 EACH | Refills: 3 | Status: SHIPPED | OUTPATIENT
Start: 2024-07-01

## 2024-07-01 NOTE — TELEPHONE ENCOUNTER
Medication:   Requested Prescriptions     Pending Prescriptions Disp Refills    Insulin Pen Needle (B-D ULTRAFINE III SHORT PEN) 31G X 8 MM MISC 100 each 3     Si each by Does not apply route daily        Last Filled:  23    Patient Phone Number: [phone] (home)     Last appt: 2024   Next appt: 2024    Last OARRS:        No data to display

## 2024-08-19 ENCOUNTER — OFFICE VISIT (OUTPATIENT)
Dept: PRIMARY CARE CLINIC | Age: 74
End: 2024-08-19
Payer: COMMERCIAL

## 2024-08-19 VITALS
TEMPERATURE: 97.4 F | HEIGHT: 70 IN | BODY MASS INDEX: 30.69 KG/M2 | WEIGHT: 214.4 LBS | HEART RATE: 88 BPM | DIASTOLIC BLOOD PRESSURE: 88 MMHG | SYSTOLIC BLOOD PRESSURE: 138 MMHG

## 2024-08-19 DIAGNOSIS — E11.69 TYPE 2 DIABETES MELLITUS WITH OBESITY (HCC): ICD-10-CM

## 2024-08-19 DIAGNOSIS — E66.9 TYPE 2 DIABETES MELLITUS WITH OBESITY (HCC): ICD-10-CM

## 2024-08-19 DIAGNOSIS — E11.69 TYPE 2 DIABETES MELLITUS WITH OBESITY (HCC): Primary | ICD-10-CM

## 2024-08-19 DIAGNOSIS — E66.9 TYPE 2 DIABETES MELLITUS WITH OBESITY (HCC): Primary | ICD-10-CM

## 2024-08-19 DIAGNOSIS — I10 ESSENTIAL HYPERTENSION, BENIGN: ICD-10-CM

## 2024-08-19 LAB — HBA1C MFR BLD: 12.7 %

## 2024-08-19 PROCEDURE — G2211 COMPLEX E/M VISIT ADD ON: HCPCS | Performed by: STUDENT IN AN ORGANIZED HEALTH CARE EDUCATION/TRAINING PROGRAM

## 2024-08-19 PROCEDURE — 3079F DIAST BP 80-89 MM HG: CPT | Performed by: STUDENT IN AN ORGANIZED HEALTH CARE EDUCATION/TRAINING PROGRAM

## 2024-08-19 PROCEDURE — 83036 HEMOGLOBIN GLYCOSYLATED A1C: CPT | Performed by: STUDENT IN AN ORGANIZED HEALTH CARE EDUCATION/TRAINING PROGRAM

## 2024-08-19 PROCEDURE — 1123F ACP DISCUSS/DSCN MKR DOCD: CPT | Performed by: STUDENT IN AN ORGANIZED HEALTH CARE EDUCATION/TRAINING PROGRAM

## 2024-08-19 PROCEDURE — 99214 OFFICE O/P EST MOD 30 MIN: CPT | Performed by: STUDENT IN AN ORGANIZED HEALTH CARE EDUCATION/TRAINING PROGRAM

## 2024-08-19 PROCEDURE — 3075F SYST BP GE 130 - 139MM HG: CPT | Performed by: STUDENT IN AN ORGANIZED HEALTH CARE EDUCATION/TRAINING PROGRAM

## 2024-08-19 PROCEDURE — 3046F HEMOGLOBIN A1C LEVEL >9.0%: CPT | Performed by: STUDENT IN AN ORGANIZED HEALTH CARE EDUCATION/TRAINING PROGRAM

## 2024-08-19 RX ORDER — DULAGLUTIDE 0.75 MG/.5ML
0.75 INJECTION, SOLUTION SUBCUTANEOUS WEEKLY
Qty: 2 ML | Refills: 0 | Status: SHIPPED | OUTPATIENT
Start: 2024-08-19

## 2024-08-19 SDOH — ECONOMIC STABILITY: TRANSPORTATION INSECURITY
IN THE PAST 12 MONTHS, HAS LACK OF TRANSPORTATION KEPT YOU FROM MEETINGS, WORK, OR FROM GETTING THINGS NEEDED FOR DAILY LIVING?: NO

## 2024-08-19 SDOH — ECONOMIC STABILITY: FOOD INSECURITY: WITHIN THE PAST 12 MONTHS, THE FOOD YOU BOUGHT JUST DIDN'T LAST AND YOU DIDN'T HAVE MONEY TO GET MORE.: NEVER TRUE

## 2024-08-19 SDOH — ECONOMIC STABILITY: FOOD INSECURITY: WITHIN THE PAST 12 MONTHS, YOU WORRIED THAT YOUR FOOD WOULD RUN OUT BEFORE YOU GOT MONEY TO BUY MORE.: NEVER TRUE

## 2024-08-19 SDOH — ECONOMIC STABILITY: INCOME INSECURITY: HOW HARD IS IT FOR YOU TO PAY FOR THE VERY BASICS LIKE FOOD, HOUSING, MEDICAL CARE, AND HEATING?: NOT HARD AT ALL

## 2024-08-19 ASSESSMENT — ENCOUNTER SYMPTOMS
WHEEZING: 0
NAUSEA: 0
SHORTNESS OF BREATH: 0

## 2024-08-19 NOTE — PROGRESS NOTES
[facility]  2024    Chandler Hilton (:  1950) is a 73 y.o. male, here for evaluation of the following medical concerns:    Chief Complaint   Patient presents with    Diabetes    Hypertension        ASSESSMENT/ PLAN  1. Type 2 diabetes mellitus with obesity (HCC)  Uncontrolled, add back Trulicity at low-dose.  Patient to notify us immediately if he cannot tolerate it as we will need to augment his antihyperglycemic control with increasing insulin or adding an additional oral agent.  Update labs and return to clinic in 1 month  - POCT glycosylated hemoglobin (Hb A1C)  - Comprehensive Metabolic Panel; Future  - Lipid Panel; Future  - Microalbumin / Creatinine Urine Ratio; Future  - dulaglutide (TRULICITY) 0.75 MG/0.5ML SOPN SC injection; Inject 0.5 mLs into the skin once a week  Dispense: 2 mL; Refill: 0    2. Essential hypertension, benign  Controlled, continue lisinopril       Return in about 4 weeks (around 2024) for diabetes follow up.    HPI  Patient presents today for follow-up on diabetes, hypertension.    Since previous appointment, he stopped Trulicity secondary to nausea.  He did not notify us prior to doing this.  He was taking 3 mg weekly up until that time with good control of his diabetes.  He takes 10 units of Lantus nightly as well as Jardiance.  Does not routinely check his home blood sugars.  No changes in diet or exercise-typically gets about 10,000 steps per day.    ROS  Review of Systems   Constitutional:  Negative for activity change and unexpected weight change.   HENT:  Negative for tinnitus.    Eyes:  Negative for visual disturbance.   Respiratory:  Negative for shortness of breath and wheezing.    Cardiovascular:  Negative for chest pain, palpitations and leg swelling.   Gastrointestinal:  Negative for nausea.   Genitourinary:  Negative for decreased urine volume.   Neurological:  Negative for syncope, light-headedness and headaches.       HISTORIES  Current

## 2024-08-20 LAB
ALBUMIN SERPL-MCNC: 4.6 G/DL (ref 3.4–5)
ALBUMIN/GLOB SERPL: 1.9 {RATIO} (ref 1.1–2.2)
ALP SERPL-CCNC: 94 U/L (ref 40–129)
ALT SERPL-CCNC: 27 U/L (ref 10–40)
ANION GAP SERPL CALCULATED.3IONS-SCNC: 13 MMOL/L (ref 3–16)
AST SERPL-CCNC: 21 U/L (ref 15–37)
BILIRUB SERPL-MCNC: 0.9 MG/DL (ref 0–1)
BUN SERPL-MCNC: 14 MG/DL (ref 7–20)
CALCIUM SERPL-MCNC: 8.5 MG/DL (ref 8.3–10.6)
CHLORIDE SERPL-SCNC: 100 MMOL/L (ref 99–110)
CHOLEST SERPL-MCNC: 117 MG/DL (ref 0–199)
CO2 SERPL-SCNC: 25 MMOL/L (ref 21–32)
CREAT SERPL-MCNC: 0.8 MG/DL (ref 0.8–1.3)
CREAT UR-MCNC: 60.6 MG/DL (ref 39–259)
GFR SERPLBLD CREATININE-BSD FMLA CKD-EPI: >90 ML/MIN/{1.73_M2}
GLUCOSE SERPL-MCNC: 278 MG/DL (ref 70–99)
HDLC SERPL-MCNC: 45 MG/DL (ref 40–60)
LDLC SERPL CALC-MCNC: 52 MG/DL
MICROALBUMIN UR DL<=1MG/L-MCNC: 3.74 MG/DL
MICROALBUMIN/CREAT UR: 61.7 MG/G (ref 0–30)
POTASSIUM SERPL-SCNC: 4.3 MMOL/L (ref 3.5–5.1)
PROT SERPL-MCNC: 7 G/DL (ref 6.4–8.2)
SODIUM SERPL-SCNC: 138 MMOL/L (ref 136–145)
TRIGL SERPL-MCNC: 99 MG/DL (ref 0–150)
VLDLC SERPL CALC-MCNC: 20 MG/DL

## 2024-09-13 DIAGNOSIS — Z79.4 TYPE 2 DIABETES MELLITUS WITHOUT COMPLICATION, WITH LONG-TERM CURRENT USE OF INSULIN (HCC): ICD-10-CM

## 2024-09-13 DIAGNOSIS — E11.9 TYPE 2 DIABETES MELLITUS WITHOUT COMPLICATION, WITH LONG-TERM CURRENT USE OF INSULIN (HCC): ICD-10-CM

## 2024-09-15 DIAGNOSIS — E11.69 TYPE 2 DIABETES MELLITUS WITH OBESITY (HCC): ICD-10-CM

## 2024-09-15 DIAGNOSIS — E66.9 TYPE 2 DIABETES MELLITUS WITH OBESITY (HCC): ICD-10-CM

## 2024-09-16 ENCOUNTER — OFFICE VISIT (OUTPATIENT)
Dept: PRIMARY CARE CLINIC | Age: 74
End: 2024-09-16
Payer: COMMERCIAL

## 2024-09-16 VITALS
BODY MASS INDEX: 30.36 KG/M2 | DIASTOLIC BLOOD PRESSURE: 76 MMHG | TEMPERATURE: 97.2 F | HEART RATE: 81 BPM | SYSTOLIC BLOOD PRESSURE: 124 MMHG | WEIGHT: 211.6 LBS

## 2024-09-16 DIAGNOSIS — L73.9 FOLLICULITIS: ICD-10-CM

## 2024-09-16 DIAGNOSIS — R20.2 NUMBNESS AND TINGLING IN LEFT HAND: ICD-10-CM

## 2024-09-16 DIAGNOSIS — E11.69 TYPE 2 DIABETES MELLITUS WITH OBESITY (HCC): Primary | ICD-10-CM

## 2024-09-16 DIAGNOSIS — E66.9 TYPE 2 DIABETES MELLITUS WITH OBESITY (HCC): Primary | ICD-10-CM

## 2024-09-16 DIAGNOSIS — R20.0 NUMBNESS AND TINGLING IN LEFT HAND: ICD-10-CM

## 2024-09-16 LAB — HBA1C MFR BLD: 10.5 %

## 2024-09-16 PROCEDURE — 99214 OFFICE O/P EST MOD 30 MIN: CPT | Performed by: STUDENT IN AN ORGANIZED HEALTH CARE EDUCATION/TRAINING PROGRAM

## 2024-09-16 PROCEDURE — G2211 COMPLEX E/M VISIT ADD ON: HCPCS | Performed by: STUDENT IN AN ORGANIZED HEALTH CARE EDUCATION/TRAINING PROGRAM

## 2024-09-16 PROCEDURE — 3046F HEMOGLOBIN A1C LEVEL >9.0%: CPT | Performed by: STUDENT IN AN ORGANIZED HEALTH CARE EDUCATION/TRAINING PROGRAM

## 2024-09-16 PROCEDURE — 83036 HEMOGLOBIN GLYCOSYLATED A1C: CPT | Performed by: STUDENT IN AN ORGANIZED HEALTH CARE EDUCATION/TRAINING PROGRAM

## 2024-09-16 PROCEDURE — 1123F ACP DISCUSS/DSCN MKR DOCD: CPT | Performed by: STUDENT IN AN ORGANIZED HEALTH CARE EDUCATION/TRAINING PROGRAM

## 2024-09-16 PROCEDURE — 3078F DIAST BP <80 MM HG: CPT | Performed by: STUDENT IN AN ORGANIZED HEALTH CARE EDUCATION/TRAINING PROGRAM

## 2024-09-16 PROCEDURE — 3074F SYST BP LT 130 MM HG: CPT | Performed by: STUDENT IN AN ORGANIZED HEALTH CARE EDUCATION/TRAINING PROGRAM

## 2024-09-16 RX ORDER — DOXYCYCLINE HYCLATE 100 MG
100 TABLET ORAL 2 TIMES DAILY
Qty: 10 TABLET | Refills: 0 | Status: SHIPPED | OUTPATIENT
Start: 2024-09-16 | End: 2024-09-21

## 2024-09-16 RX ORDER — DULAGLUTIDE 0.75 MG/.5ML
INJECTION, SOLUTION SUBCUTANEOUS
Qty: 2 ML | Refills: 2 | Status: SHIPPED | OUTPATIENT
Start: 2024-09-16

## 2024-09-16 RX ORDER — ACYCLOVIR 400 MG/1
TABLET ORAL
Qty: 1 EACH | Refills: 0 | Status: SHIPPED | OUTPATIENT
Start: 2024-09-16

## 2024-09-16 ASSESSMENT — ENCOUNTER SYMPTOMS
SHORTNESS OF BREATH: 0
NAUSEA: 0
WHEEZING: 0

## 2024-09-25 DIAGNOSIS — R20.2 NUMBNESS AND TINGLING IN LEFT HAND: Primary | ICD-10-CM

## 2024-09-25 DIAGNOSIS — R20.0 NUMBNESS AND TINGLING IN LEFT HAND: Primary | ICD-10-CM

## 2024-10-03 ENCOUNTER — OFFICE VISIT (OUTPATIENT)
Dept: NEUROLOGY | Age: 74
End: 2024-10-03
Payer: COMMERCIAL

## 2024-10-03 VITALS
SYSTOLIC BLOOD PRESSURE: 153 MMHG | BODY MASS INDEX: 30.21 KG/M2 | OXYGEN SATURATION: 97 % | HEART RATE: 106 BPM | HEIGHT: 70 IN | DIASTOLIC BLOOD PRESSURE: 93 MMHG | WEIGHT: 211 LBS

## 2024-10-03 DIAGNOSIS — R29.818 TRANSIENT NEUROLOGICAL SYMPTOMS: Primary | ICD-10-CM

## 2024-10-03 DIAGNOSIS — E11.42 DIABETIC POLYNEUROPATHY ASSOCIATED WITH TYPE 2 DIABETES MELLITUS (HCC): ICD-10-CM

## 2024-10-03 DIAGNOSIS — M79.642 PAIN OF LEFT HAND: ICD-10-CM

## 2024-10-03 PROCEDURE — 3080F DIAST BP >= 90 MM HG: CPT | Performed by: STUDENT IN AN ORGANIZED HEALTH CARE EDUCATION/TRAINING PROGRAM

## 2024-10-03 PROCEDURE — 3046F HEMOGLOBIN A1C LEVEL >9.0%: CPT | Performed by: STUDENT IN AN ORGANIZED HEALTH CARE EDUCATION/TRAINING PROGRAM

## 2024-10-03 PROCEDURE — 1123F ACP DISCUSS/DSCN MKR DOCD: CPT | Performed by: STUDENT IN AN ORGANIZED HEALTH CARE EDUCATION/TRAINING PROGRAM

## 2024-10-03 PROCEDURE — 99204 OFFICE O/P NEW MOD 45 MIN: CPT | Performed by: STUDENT IN AN ORGANIZED HEALTH CARE EDUCATION/TRAINING PROGRAM

## 2024-10-03 PROCEDURE — 3077F SYST BP >= 140 MM HG: CPT | Performed by: STUDENT IN AN ORGANIZED HEALTH CARE EDUCATION/TRAINING PROGRAM

## 2024-10-03 NOTE — ASSESSMENT & PLAN NOTE
Chronic, at goal (stable), changes made today: rule out alternative cause of length dependent painless polyneuropathy (labs for monoclonal gammopathy, B12 deficiency, and RA) and lifestyle modifications recommended. He has overlying issues with bilateral hand pain and reduced finger flexion range of motion not explained by weakness suggestive of arthritis, possibly inflammatory arthritis that could cause polyneuropathy. Diabetes mellitis alone could cause evidence of painless length dependent sensory predominant polyneuropathy found on exam.

## 2024-10-03 NOTE — PROGRESS NOTES
Chandler Hilton (:  1950) is a 73 y.o. male,New patient, here for evaluation of the following chief complaint(s):  New Patient (Np referral for numbness and tingling)      Assessment & Plan   1. Transient neurological symptoms  Assessment & Plan:   New, not at goal (unstable), changes made today: brain MRI and MRA head/neck, and left arm EMG/NCS . Ddx includes recurrent stereotyped TIA involving right ICA/MCA territory (less likely but possible), entrapment mononeuropathy (no evidence of this on neuro exam), and non-neurological cause of episodic pain that is perceived as weakness (e.g. arthritis). Given low suspicion for TIA, not starting statin/aspirin at this time.   Orders:  -     External Referral to EMG  -     MRI BRAIN WO CONTRAST; Future  -     MRA HEAD WO CONTRAST; Future  -     MRA NECK W WO CONTRAST; Future  2. Diabetic polyneuropathy associated with type 2 diabetes mellitus (HCC)  Assessment & Plan:   Chronic, at goal (stable), changes made today: rule out alternative cause of length dependent painless polyneuropathy (labs for monoclonal gammopathy, B12 deficiency, and RA) and lifestyle modifications recommended. He has overlying issues with bilateral hand pain and reduced finger flexion range of motion not explained by weakness suggestive of arthritis, possibly inflammatory arthritis that could cause polyneuropathy. Diabetes mellitis alone could cause evidence of painless length dependent sensory predominant polyneuropathy found on exam. Gait is okay.   Orders:  -     RHEUMATOID FACTOR; Future  -     Electrophoresis Protein, Serum with Reflex to Immunofixation; Future  -     Methylmalonic Acid, Serum; Future  -     Vitamin B12; Future  3. Pain of left hand  Assessment & Plan:   New, not at goal (unstable), evaluate for evidence of arthritis with xray. Possible RA. Checking rheumatoid factor.  Orders:  -     XR HAND LEFT (2 VIEWS); Future  -     RHEUMATOID FACTOR; Future      Return in

## 2024-10-03 NOTE — ASSESSMENT & PLAN NOTE
New, not at goal (unstable), changes made today: brain MRI and MRA head/neck, and left arm EMG/NCS . Ddx includes recurrent stereotyped TIA involving right ICA/MCA territory (less likely but possible), entrapment mononeuropathy (no evidence of this on neuro exam), and non-neurological cause of episodic pain that is perceived as weakness (e.g. arthritis). Given low suspicion for TIA, not starting statin/aspirin at this time.

## 2024-10-03 NOTE — ASSESSMENT & PLAN NOTE
New, not at goal (unstable), evaluate for evidence of arthritis with xray. Possible RA. Checking rheumatoid factor.

## 2024-10-14 ENCOUNTER — HOSPITAL ENCOUNTER (OUTPATIENT)
Age: 74
Discharge: HOME | End: 2024-10-14
Attending: STUDENT IN AN ORGANIZED HEALTH CARE EDUCATION/TRAINING PROGRAM
Payer: COMMERCIAL

## 2024-10-14 ENCOUNTER — HOSPITAL ENCOUNTER (OUTPATIENT)
Dept: MRI IMAGING | Age: 74
Discharge: HOME | End: 2024-10-14
Attending: STUDENT IN AN ORGANIZED HEALTH CARE EDUCATION/TRAINING PROGRAM
Payer: COMMERCIAL

## 2024-10-14 ENCOUNTER — HOSPITAL ENCOUNTER (OUTPATIENT)
Dept: GENERAL RADIOLOGY | Age: 74
Discharge: HOME | End: 2024-10-14
Attending: STUDENT IN AN ORGANIZED HEALTH CARE EDUCATION/TRAINING PROGRAM
Payer: COMMERCIAL

## 2024-10-14 DIAGNOSIS — R29.818 TRANSIENT NEUROLOGICAL SYMPTOMS: ICD-10-CM

## 2024-10-14 DIAGNOSIS — E11.42 DIABETIC POLYNEUROPATHY ASSOCIATED WITH TYPE 2 DIABETES MELLITUS (HCC): ICD-10-CM

## 2024-10-14 DIAGNOSIS — M79.642 PAIN OF LEFT HAND: ICD-10-CM

## 2024-10-14 LAB
PERFORMED ON: ABNORMAL
POC CREATININE: 0.7 MG/DL (ref 0.8–1.3)
POC SAMPLE TYPE: ABNORMAL
RHEUMATOID FACT SER IA-ACNC: <10 IU/ML
VIT B12 SERPL-MCNC: 937 PG/ML (ref 211–911)

## 2024-10-14 PROCEDURE — 82607 VITAMIN B-12: CPT

## 2024-10-14 PROCEDURE — A9579 GAD-BASE MR CONTRAST NOS,1ML: HCPCS | Performed by: STUDENT IN AN ORGANIZED HEALTH CARE EDUCATION/TRAINING PROGRAM

## 2024-10-14 PROCEDURE — 36415 COLL VENOUS BLD VENIPUNCTURE: CPT

## 2024-10-14 PROCEDURE — 86431 RHEUMATOID FACTOR QUANT: CPT

## 2024-10-14 PROCEDURE — 84155 ASSAY OF PROTEIN SERUM: CPT

## 2024-10-14 PROCEDURE — 70551 MRI BRAIN STEM W/O DYE: CPT

## 2024-10-14 PROCEDURE — 84165 PROTEIN E-PHORESIS SERUM: CPT

## 2024-10-14 PROCEDURE — 6360000004 HC RX CONTRAST MEDICATION: Performed by: STUDENT IN AN ORGANIZED HEALTH CARE EDUCATION/TRAINING PROGRAM

## 2024-10-14 PROCEDURE — 70544 MR ANGIOGRAPHY HEAD W/O DYE: CPT

## 2024-10-14 PROCEDURE — 83921 ORGANIC ACID SINGLE QUANT: CPT

## 2024-10-14 PROCEDURE — 70549 MR ANGIOGRAPH NECK W/O&W/DYE: CPT

## 2024-10-14 PROCEDURE — 73120 X-RAY EXAM OF HAND: CPT

## 2024-10-14 PROCEDURE — 82565 ASSAY OF CREATININE: CPT

## 2024-10-14 RX ADMIN — GADOTERIDOL 19 ML: 279.3 INJECTION, SOLUTION INTRAVENOUS at 09:52

## 2024-10-16 LAB
ALBUMIN SERPL ELPH-MCNC: 3.7 G/DL (ref 3.1–4.9)
ALPHA1 GLOB SERPL ELPH-MCNC: 0.2 G/DL (ref 0.2–0.4)
ALPHA2 GLOB SERPL ELPH-MCNC: 0.9 G/DL (ref 0.4–1.1)
B-GLOBULIN SERPL ELPH-MCNC: 1.4 G/DL (ref 0.9–1.6)
GAMMA GLOB SERPL ELPH-MCNC: 1.2 G/DL (ref 0.6–1.8)
PROT SERPL-MCNC: 7.3 G/DL (ref 6.4–8.2)
SPE/IFE INTERPRETATION: NORMAL

## 2024-10-17 LAB — METHYLMALONATE SERPL-SCNC: 0.14 UMOL/L (ref 0–0.4)

## 2024-11-24 DIAGNOSIS — E11.9 TYPE 2 DIABETES MELLITUS WITHOUT COMPLICATION, WITH LONG-TERM CURRENT USE OF INSULIN (HCC): ICD-10-CM

## 2024-11-24 DIAGNOSIS — Z79.4 TYPE 2 DIABETES MELLITUS WITHOUT COMPLICATION, WITH LONG-TERM CURRENT USE OF INSULIN (HCC): ICD-10-CM

## 2024-11-25 RX ORDER — ACYCLOVIR 400 MG/1
TABLET ORAL
Qty: 1 EACH | Refills: 0 | Status: SHIPPED | OUTPATIENT
Start: 2024-11-25

## 2024-11-25 NOTE — TELEPHONE ENCOUNTER
Medication:   Requested Prescriptions     Pending Prescriptions Disp Refills    Continuous Glucose  (DEXCOM G7 ) NOEMI [Pharmacy Med Name: DEXCOM G7 ] 1 each 0     Sig: USE TO MONITOR BLOOD GLUCOSE FOUR TIMES A DAY IN THE MORNING AT NOON IN THE EVENING AND AT BEDTIME       Last Filled:  9/16/2024    Patient Phone Number: [phone] (home)     Last appt: 9/16/2024   Next appt: 12/16/2024    Last Labs DM:   Lab Results   Component Value Date/Time    LABA1C 10.5 09/16/2024 10:34 AM

## 2024-12-08 DIAGNOSIS — E11.9 TYPE 2 DIABETES MELLITUS WITHOUT COMPLICATION, WITHOUT LONG-TERM CURRENT USE OF INSULIN (HCC): ICD-10-CM

## 2024-12-09 RX ORDER — INSULIN GLARGINE 100 [IU]/ML
10 INJECTION, SOLUTION SUBCUTANEOUS NIGHTLY
Qty: 3 ML | Refills: 1 | Status: SHIPPED | OUTPATIENT
Start: 2024-12-09

## 2024-12-09 NOTE — TELEPHONE ENCOUNTER
Medication:   Requested Prescriptions     Pending Prescriptions Disp Refills    insulin glargine (LANTUS SOLOSTAR) 100 UNIT/ML injection pen [Pharmacy Med Name: LANTUS SOLOSTAR 100 UNIT/ML] 3 mL 1     Sig: INJECT 10 UNITS INTO THE SKIN NIGHTLY        Last Filled:  11/20/2023    Patient Phone Number: [phone] (home)     Last appt: 9/16/2024   Next appt: 12/16/2024    Last OARRS:        No data to display

## 2024-12-16 ENCOUNTER — OFFICE VISIT (OUTPATIENT)
Dept: PRIMARY CARE CLINIC | Age: 74
End: 2024-12-16

## 2024-12-16 VITALS
SYSTOLIC BLOOD PRESSURE: 138 MMHG | BODY MASS INDEX: 30.61 KG/M2 | TEMPERATURE: 97.1 F | HEIGHT: 70 IN | HEART RATE: 84 BPM | DIASTOLIC BLOOD PRESSURE: 85 MMHG | WEIGHT: 213.8 LBS

## 2024-12-16 DIAGNOSIS — E66.9 TYPE 2 DIABETES MELLITUS WITH OBESITY (HCC): Primary | ICD-10-CM

## 2024-12-16 DIAGNOSIS — E11.69 TYPE 2 DIABETES MELLITUS WITH OBESITY (HCC): Primary | ICD-10-CM

## 2024-12-16 LAB — HBA1C MFR BLD: 10 %

## 2024-12-16 RX ORDER — GLIPIZIDE 5 MG/1
5 TABLET, FILM COATED, EXTENDED RELEASE ORAL DAILY
Qty: 90 TABLET | Refills: 1 | Status: SHIPPED | OUTPATIENT
Start: 2024-12-16

## 2024-12-16 ASSESSMENT — ENCOUNTER SYMPTOMS
SHORTNESS OF BREATH: 0
WHEEZING: 0
NAUSEA: 0

## 2024-12-16 NOTE — PROGRESS NOTES
[facility]  2024    Chandler Hilton (:  1950) is a 74 y.o. male, here for evaluation of the following medical concerns:    Chief Complaint   Patient presents with    Hypertension    Diabetes    Medication Refill     Amlodipine         ASSESSMENT/ PLAN  1. Type 2 diabetes mellitus with obesity (HCC)  Uncontrolled, continue current medication regimen and will add glipizide today.  Continue checking home blood sugar readings for goal of  fasting.  - POCT glycosylated hemoglobin (Hb A1C)  - glipiZIDE (GLUCOTROL XL) 5 MG extended release tablet; Take 1 tablet by mouth daily  Dispense: 90 tablet; Refill: 1       Return in 3 months (on 3/16/2025) for blood pressure follow-up, diabetes follow up.    HPI  Patient presents today for follow-up on diabetes.    He has been taking and tolerating Jardiance, Trulicity, 10 units of insulin as prescribed for his diabetes.  Home blood sugar readings higher than goal.  We previously increased his Trulicity, but was unable to tolerate secondary to GI side effects.  He was also not able to tolerate metformin previously.  No particular diet or exercise plan.  Denies any hypoglycemic episodes.    ROS  Review of Systems   Constitutional:  Negative for activity change and unexpected weight change.   HENT:  Negative for tinnitus.    Eyes:  Negative for visual disturbance.   Respiratory:  Negative for shortness of breath and wheezing.    Cardiovascular:  Negative for chest pain, palpitations and leg swelling.   Gastrointestinal:  Negative for nausea.   Genitourinary:  Negative for decreased urine volume.   Neurological:  Negative for syncope, light-headedness and headaches.       HISTORIES  Current Outpatient Medications on File Prior to Visit   Medication Sig Dispense Refill    LANTUS SOLOSTAR 100 UNIT/ML injection pen INJECT 10 UNITS INTO THE SKIN NIGHTLY 3 mL 1    Continuous Glucose  (DEXCOM G7 ) NOEMI USE TO MONITOR BLOOD GLUCOSE FOUR

## 2025-01-01 DIAGNOSIS — E66.9 TYPE 2 DIABETES MELLITUS WITH OBESITY (HCC): ICD-10-CM

## 2025-01-01 DIAGNOSIS — E11.69 TYPE 2 DIABETES MELLITUS WITH OBESITY (HCC): ICD-10-CM

## 2025-01-02 RX ORDER — DULAGLUTIDE 0.75 MG/.5ML
INJECTION, SOLUTION SUBCUTANEOUS
Qty: 2 ML | Refills: 2 | Status: SHIPPED | OUTPATIENT
Start: 2025-01-02

## 2025-01-28 DIAGNOSIS — E11.9 TYPE 2 DIABETES MELLITUS WITHOUT COMPLICATION, WITH LONG-TERM CURRENT USE OF INSULIN (HCC): ICD-10-CM

## 2025-01-28 DIAGNOSIS — Z79.4 TYPE 2 DIABETES MELLITUS WITHOUT COMPLICATION, WITH LONG-TERM CURRENT USE OF INSULIN (HCC): ICD-10-CM

## 2025-01-28 RX ORDER — ACYCLOVIR 400 MG/1
TABLET ORAL
Qty: 3 EACH | Refills: 11 | Status: SHIPPED | OUTPATIENT
Start: 2025-01-28

## 2025-01-28 NOTE — TELEPHONE ENCOUNTER
Medication:   Requested Prescriptions     Pending Prescriptions Disp Refills    Continuous Glucose Sensor (DEXCOM G7 SENSOR) MISC [Pharmacy Med Name: DEXCOM G7 SENSOR] 3 each 5     Sig: APPLY SENSOR TO SKIN FOR CONTINUOUS BLOOD SUGAR MONITORING, REPLACE EVERY 10 DAYS        Last Filled:  2/20/2024    Patient Phone Number: [phone] (home)     Last appt: 12/16/2024   Next appt: 3/17/2025            Return in 3 months (on 3/16/2025) for blood pressure follow-up, diabetes follow up.

## 2025-02-06 DIAGNOSIS — E11.9 TYPE 2 DIABETES MELLITUS WITHOUT COMPLICATION, WITHOUT LONG-TERM CURRENT USE OF INSULIN (HCC): ICD-10-CM

## 2025-02-06 RX ORDER — INSULIN GLARGINE 100 [IU]/ML
10 INJECTION, SOLUTION SUBCUTANEOUS NIGHTLY
Qty: 3 ML | Refills: 1 | Status: SHIPPED | OUTPATIENT
Start: 2025-02-06

## 2025-02-06 NOTE — TELEPHONE ENCOUNTER
Medication:   Requested Prescriptions     Pending Prescriptions Disp Refills    LANTUS SOLOSTAR 100 UNIT/ML injection pen [Pharmacy Med Name: LANTUS SOLOSTAR 100 UNIT/ML] 3 mL 1     Sig: INJECT 10 UNITS INTO THE SKIN NIGHTLY        Last Filled:  12/9/24    Patient Phone Number: [phone] (home)     Last appt: 12/16/2024   Next appt: 3/17/2025    Last OARRS:        No data to display

## 2025-02-11 DIAGNOSIS — E11.9 TYPE 2 DIABETES MELLITUS WITHOUT COMPLICATION, WITHOUT LONG-TERM CURRENT USE OF INSULIN (HCC): ICD-10-CM

## 2025-02-11 NOTE — TELEPHONE ENCOUNTER
Medication:   Requested Prescriptions     Pending Prescriptions Disp Refills    empagliflozin (JARDIANCE) 25 MG tablet [Pharmacy Med Name: JARDIANCE 25 MG TABLET] 30 tablet 11     Sig: TAKE 1 TABLET BY MOUTH DAILY        Last Filled:  01/16/24    Patient Phone Number: [phone] (home)     Last appt: 12/16/2024   Next appt: 3/24/2025    Last OARRS:        No data to display

## 2025-03-24 ENCOUNTER — OFFICE VISIT (OUTPATIENT)
Dept: PRIMARY CARE CLINIC | Age: 75
End: 2025-03-24
Payer: COMMERCIAL

## 2025-03-24 VITALS
WEIGHT: 216 LBS | HEART RATE: 84 BPM | HEIGHT: 70 IN | SYSTOLIC BLOOD PRESSURE: 139 MMHG | DIASTOLIC BLOOD PRESSURE: 82 MMHG | BODY MASS INDEX: 30.92 KG/M2 | TEMPERATURE: 98.6 F

## 2025-03-24 DIAGNOSIS — E11.69 TYPE 2 DIABETES MELLITUS WITH OBESITY (HCC): Primary | ICD-10-CM

## 2025-03-24 DIAGNOSIS — E66.9 TYPE 2 DIABETES MELLITUS WITH OBESITY (HCC): Primary | ICD-10-CM

## 2025-03-24 DIAGNOSIS — I10 ESSENTIAL HYPERTENSION, BENIGN: ICD-10-CM

## 2025-03-24 PROBLEM — M79.642 PAIN OF LEFT HAND: Status: RESOLVED | Noted: 2024-10-03 | Resolved: 2025-03-24

## 2025-03-24 LAB — HBA1C MFR BLD: 7.4 %

## 2025-03-24 PROCEDURE — 3075F SYST BP GE 130 - 139MM HG: CPT | Performed by: STUDENT IN AN ORGANIZED HEALTH CARE EDUCATION/TRAINING PROGRAM

## 2025-03-24 PROCEDURE — 83036 HEMOGLOBIN GLYCOSYLATED A1C: CPT | Performed by: STUDENT IN AN ORGANIZED HEALTH CARE EDUCATION/TRAINING PROGRAM

## 2025-03-24 PROCEDURE — G2211 COMPLEX E/M VISIT ADD ON: HCPCS | Performed by: STUDENT IN AN ORGANIZED HEALTH CARE EDUCATION/TRAINING PROGRAM

## 2025-03-24 PROCEDURE — 1123F ACP DISCUSS/DSCN MKR DOCD: CPT | Performed by: STUDENT IN AN ORGANIZED HEALTH CARE EDUCATION/TRAINING PROGRAM

## 2025-03-24 PROCEDURE — 3051F HG A1C>EQUAL 7.0%<8.0%: CPT | Performed by: STUDENT IN AN ORGANIZED HEALTH CARE EDUCATION/TRAINING PROGRAM

## 2025-03-24 PROCEDURE — 99214 OFFICE O/P EST MOD 30 MIN: CPT | Performed by: STUDENT IN AN ORGANIZED HEALTH CARE EDUCATION/TRAINING PROGRAM

## 2025-03-24 PROCEDURE — 3079F DIAST BP 80-89 MM HG: CPT | Performed by: STUDENT IN AN ORGANIZED HEALTH CARE EDUCATION/TRAINING PROGRAM

## 2025-03-24 SDOH — ECONOMIC STABILITY: FOOD INSECURITY: WITHIN THE PAST 12 MONTHS, THE FOOD YOU BOUGHT JUST DIDN'T LAST AND YOU DIDN'T HAVE MONEY TO GET MORE.: NEVER TRUE

## 2025-03-24 SDOH — ECONOMIC STABILITY: FOOD INSECURITY: WITHIN THE PAST 12 MONTHS, YOU WORRIED THAT YOUR FOOD WOULD RUN OUT BEFORE YOU GOT MONEY TO BUY MORE.: NEVER TRUE

## 2025-03-24 ASSESSMENT — ENCOUNTER SYMPTOMS
SHORTNESS OF BREATH: 0
NAUSEA: 0
WHEEZING: 0

## 2025-03-24 ASSESSMENT — PATIENT HEALTH QUESTIONNAIRE - PHQ9
SUM OF ALL RESPONSES TO PHQ QUESTIONS 1-9: 0
2. FEELING DOWN, DEPRESSED OR HOPELESS: NOT AT ALL
SUM OF ALL RESPONSES TO PHQ QUESTIONS 1-9: 0
1. LITTLE INTEREST OR PLEASURE IN DOING THINGS: NOT AT ALL
SUM OF ALL RESPONSES TO PHQ QUESTIONS 1-9: 0
SUM OF ALL RESPONSES TO PHQ QUESTIONS 1-9: 0

## 2025-03-24 NOTE — PROGRESS NOTES
[facility]  3/24/2025    Chandler Hilton (:  1950) is a 74 y.o. male, here for evaluation of the following medical concerns:    Chief Complaint   Patient presents with    Diabetes    Hypertension        ASSESSMENT/ PLAN  1. Type 2 diabetes mellitus with obesity (HCC)  Controlled, continue current medication regimen and update blood work at follow-up appointment  - POCT glycosylated hemoglobin (Hb A1C)    2. Essential hypertension, benign  Controlled, continue lisinopril daily as prescribed       Return in about 6 months (around 2025), or if symptoms worsen or fail to improve, for blood pressure follow-up, diabetes follow up.    HPI  Patient presents today for follow-up on diabetes, hypertension.    He has been taking Jardiance, Trulicity, insulin and glipizide for his diabetes.  Since previous appointment, endorses significantly increased blood sugar control.  Last week, started exercising for goal of 10,000 steps per day.  He has been unable to tolerate an increased dose of Trulicity secondary to GI side effects.  He was unable to tolerate metformin previously.  Denies any hypoglycemic episodes.    ROS  Review of Systems   Constitutional:  Negative for activity change and unexpected weight change.   HENT:  Negative for tinnitus.    Eyes:  Negative for visual disturbance.   Respiratory:  Negative for shortness of breath and wheezing.    Cardiovascular:  Negative for chest pain, palpitations and leg swelling.   Gastrointestinal:  Negative for nausea.   Genitourinary:  Negative for decreased urine volume.   Neurological:  Negative for syncope, light-headedness and headaches.       HISTORIES  Current Outpatient Medications on File Prior to Visit   Medication Sig Dispense Refill    empagliflozin (JARDIANCE) 25 MG tablet TAKE 1 TABLET BY MOUTH DAILY 30 tablet 11    LANTUS SOLOSTAR 100 UNIT/ML injection pen INJECT 10 UNITS INTO THE SKIN NIGHTLY 3 mL 1    Continuous Glucose Sensor (DEXCOM G7

## 2025-03-25 DIAGNOSIS — E11.69 TYPE 2 DIABETES MELLITUS WITH OBESITY (HCC): ICD-10-CM

## 2025-03-25 DIAGNOSIS — E66.9 TYPE 2 DIABETES MELLITUS WITH OBESITY (HCC): ICD-10-CM

## 2025-03-25 RX ORDER — DULAGLUTIDE 0.75 MG/.5ML
INJECTION, SOLUTION SUBCUTANEOUS
Qty: 2 ML | Refills: 5 | Status: SHIPPED | OUTPATIENT
Start: 2025-03-25

## 2025-03-25 NOTE — TELEPHONE ENCOUNTER
Medication:   Requested Prescriptions     Pending Prescriptions Disp Refills    TRULICITY 0.75 MG/0.5ML SOAJ SC injection [Pharmacy Med Name: TRULICITY 0.75 MG/0.5 ML PEN] 2 mL 2     Sig: INJECT 0.75 MG UNDER THE SKIN ONCE WEEKLY        Last Filled:  1/02/2025    Patient Phone Number: [phone] (home)     Last appt: 3/24/2025   Next appt: Visit date not found          Return in about 6 months (around 9/24/2025), or if symptoms worsen or fail to improve, for blood pressure follow-up, diabetes follow up.

## 2025-04-03 DIAGNOSIS — E11.9 TYPE 2 DIABETES MELLITUS WITHOUT COMPLICATION, WITHOUT LONG-TERM CURRENT USE OF INSULIN: ICD-10-CM

## 2025-04-03 RX ORDER — INSULIN GLARGINE 100 [IU]/ML
INJECTION, SOLUTION SUBCUTANEOUS
Qty: 3 ML | Refills: 5 | Status: SHIPPED | OUTPATIENT
Start: 2025-04-03

## 2025-04-03 NOTE — TELEPHONE ENCOUNTER
Medication:   Requested Prescriptions     Pending Prescriptions Disp Refills    LANTUS SOLOSTAR 100 UNIT/ML injection pen [Pharmacy Med Name: LANTUS SOLOSTAR 100 UNIT/ML] 3 mL 1     Sig: INJECT 10 UNITS UNDER THE SKIN ONCE NIGHTLY        Last Filled:  2/06/2025    Patient Phone Number: [phone] (home)     Last appt: 3/24/2025   Next appt: 9/22/2025             Return in about 6 months (around 9/24/2025), or if symptoms worsen or fail to improve, for blood pressure follow-up, diabetes follow up.

## 2025-06-23 DIAGNOSIS — E11.9 TYPE 2 DIABETES MELLITUS WITHOUT COMPLICATION, WITHOUT LONG-TERM CURRENT USE OF INSULIN (HCC): ICD-10-CM

## 2025-06-23 DIAGNOSIS — E11.69 TYPE 2 DIABETES MELLITUS WITH OBESITY (HCC): ICD-10-CM

## 2025-06-23 DIAGNOSIS — E66.9 TYPE 2 DIABETES MELLITUS WITH OBESITY (HCC): ICD-10-CM

## 2025-06-24 RX ORDER — GLIPIZIDE 5 MG/1
5 TABLET, FILM COATED, EXTENDED RELEASE ORAL DAILY
Qty: 90 TABLET | Refills: 1 | Status: SHIPPED | OUTPATIENT
Start: 2025-06-24

## 2025-06-24 RX ORDER — ATORVASTATIN CALCIUM 20 MG/1
20 TABLET, FILM COATED ORAL NIGHTLY
Qty: 90 TABLET | Refills: 3 | Status: SHIPPED | OUTPATIENT
Start: 2025-06-24

## 2025-06-24 NOTE — TELEPHONE ENCOUNTER
Medication:   Requested Prescriptions     Pending Prescriptions Disp Refills    atorvastatin (LIPITOR) 20 MG tablet [Pharmacy Med Name: ATORVASTATIN TAB 20MG] 90 tablet 3     Sig: TAKE 1 TABLET NIGHTLY        Last Filled:  6/20/24    Patient Phone Number: [phone] (home)     Last appt: 3/24/2025   Next appt: 6/23/2025    Last OARRS:        No data to display

## 2025-06-24 NOTE — TELEPHONE ENCOUNTER
Medication:   Requested Prescriptions     Pending Prescriptions Disp Refills    glipiZIDE (GLUCOTROL XL) 5 MG extended release tablet [Pharmacy Med Name: GLIPIZIDE XL TAB 5MG] 90 tablet 1     Sig: TAKE 1 TABLET DAILY        Last Filled:  12/16/24    Patient Phone Number: [phone] (home)     Last appt: 3/24/2025   Next appt: 9/22/2025    Last OARRS:        No data to display

## 2025-06-26 DIAGNOSIS — I10 ESSENTIAL HYPERTENSION, BENIGN: Primary | ICD-10-CM

## 2025-07-11 DIAGNOSIS — L23.7 POISON IVY: Primary | ICD-10-CM

## 2025-07-11 RX ORDER — PREDNISONE 20 MG/1
40 TABLET ORAL EVERY MORNING
Qty: 10 TABLET | Refills: 0 | Status: SHIPPED | OUTPATIENT
Start: 2025-07-11 | End: 2025-07-16

## 2025-07-17 DIAGNOSIS — I25.10 CORONARY ARTERY DISEASE INVOLVING NATIVE CORONARY ARTERY OF NATIVE HEART, UNSPECIFIED WHETHER ANGINA PRESENT: Primary | ICD-10-CM

## 2025-07-17 RX ORDER — SODIUM CHLORIDE 9 MG/ML
INJECTION, SOLUTION INTRAVENOUS PRN
OUTPATIENT
Start: 2025-07-17

## 2025-07-17 RX ORDER — METOPROLOL TARTRATE 1 MG/ML
5 INJECTION, SOLUTION INTRAVENOUS EVERY 5 MIN PRN
OUTPATIENT
Start: 2025-07-17

## 2025-07-17 RX ORDER — SODIUM CHLORIDE 0.9 % (FLUSH) 0.9 %
5-40 SYRINGE (ML) INJECTION EVERY 12 HOURS SCHEDULED
OUTPATIENT
Start: 2025-07-17

## 2025-07-17 RX ORDER — SODIUM CHLORIDE 0.9 % (FLUSH) 0.9 %
5-40 SYRINGE (ML) INJECTION PRN
OUTPATIENT
Start: 2025-07-17

## 2025-07-17 RX ORDER — NITROGLYCERIN 0.4 MG/1
0.8 TABLET SUBLINGUAL
OUTPATIENT
Start: 2025-07-17

## 2025-07-17 RX ORDER — NITROGLYCERIN 0.4 MG/1
0.4 TABLET SUBLINGUAL
OUTPATIENT
Start: 2025-07-17

## 2025-07-17 RX ORDER — METOPROLOL TARTRATE 50 MG
TABLET ORAL
Qty: 3 TABLET | Refills: 0 | Status: SHIPPED | OUTPATIENT
Start: 2025-07-17

## 2025-07-19 DIAGNOSIS — E11.9 TYPE 2 DIABETES MELLITUS WITHOUT COMPLICATION, WITH LONG-TERM CURRENT USE OF INSULIN (HCC): ICD-10-CM

## 2025-07-19 DIAGNOSIS — Z79.4 TYPE 2 DIABETES MELLITUS WITHOUT COMPLICATION, WITH LONG-TERM CURRENT USE OF INSULIN (HCC): ICD-10-CM

## 2025-07-21 RX ORDER — PEN NEEDLE, DIABETIC 31 GX5/16"
1 NEEDLE, DISPOSABLE MISCELLANEOUS DAILY
Qty: 100 EACH | Refills: 3 | Status: SHIPPED | OUTPATIENT
Start: 2025-07-21